# Patient Record
Sex: MALE | NOT HISPANIC OR LATINO | URBAN - METROPOLITAN AREA
[De-identification: names, ages, dates, MRNs, and addresses within clinical notes are randomized per-mention and may not be internally consistent; named-entity substitution may affect disease eponyms.]

---

## 2023-10-29 ENCOUNTER — INPATIENT (INPATIENT)
Facility: HOSPITAL | Age: 88
LOS: 8 days | Discharge: NURSING, REHAB OR LONG-TERM CARE | DRG: 521 | End: 2023-11-07
Attending: ORTHOPAEDIC SURGERY | Admitting: THORACIC SURGERY (CARDIOTHORACIC VASCULAR SURGERY)
Payer: MEDICARE

## 2023-10-29 VITALS
OXYGEN SATURATION: 100 % | SYSTOLIC BLOOD PRESSURE: 123 MMHG | DIASTOLIC BLOOD PRESSURE: 59 MMHG | RESPIRATION RATE: 20 BRPM | HEART RATE: 77 BPM

## 2023-10-29 DIAGNOSIS — I35.0 NONRHEUMATIC AORTIC (VALVE) STENOSIS: ICD-10-CM

## 2023-10-29 DIAGNOSIS — S72.001A FRACTURE OF UNSPECIFIED PART OF NECK OF RIGHT FEMUR, INITIAL ENCOUNTER FOR CLOSED FRACTURE: ICD-10-CM

## 2023-10-29 DIAGNOSIS — S12.110A ANTERIOR DISPLACED TYPE II DENS FRACTURE, INITIAL ENCOUNTER FOR CLOSED FRACTURE: ICD-10-CM

## 2023-10-29 DIAGNOSIS — I48.20 CHRONIC ATRIAL FIBRILLATION, UNSPECIFIED: ICD-10-CM

## 2023-10-29 LAB
ALBUMIN SERPL ELPH-MCNC: 3.4 G/DL — SIGNIFICANT CHANGE UP (ref 3.3–5)
ALBUMIN SERPL ELPH-MCNC: 3.4 G/DL — SIGNIFICANT CHANGE UP (ref 3.3–5)
ALP SERPL-CCNC: 115 U/L — SIGNIFICANT CHANGE UP (ref 40–120)
ALP SERPL-CCNC: 115 U/L — SIGNIFICANT CHANGE UP (ref 40–120)
ALT FLD-CCNC: 24 U/L — SIGNIFICANT CHANGE UP (ref 10–45)
ALT FLD-CCNC: 24 U/L — SIGNIFICANT CHANGE UP (ref 10–45)
ANION GAP SERPL CALC-SCNC: 13 MMOL/L — SIGNIFICANT CHANGE UP (ref 5–17)
APPEARANCE UR: ABNORMAL
APPEARANCE UR: ABNORMAL
APTT BLD: 31.2 SEC — SIGNIFICANT CHANGE UP (ref 24.5–35.6)
APTT BLD: 31.2 SEC — SIGNIFICANT CHANGE UP (ref 24.5–35.6)
AST SERPL-CCNC: 30 U/L — SIGNIFICANT CHANGE UP (ref 10–40)
AST SERPL-CCNC: 30 U/L — SIGNIFICANT CHANGE UP (ref 10–40)
BACTERIA # UR AUTO: NEGATIVE — SIGNIFICANT CHANGE UP
BACTERIA # UR AUTO: NEGATIVE — SIGNIFICANT CHANGE UP
BASOPHILS # BLD AUTO: 0.06 K/UL — SIGNIFICANT CHANGE UP (ref 0–0.2)
BASOPHILS # BLD AUTO: 0.06 K/UL — SIGNIFICANT CHANGE UP (ref 0–0.2)
BASOPHILS NFR BLD AUTO: 0.6 % — SIGNIFICANT CHANGE UP (ref 0–2)
BASOPHILS NFR BLD AUTO: 0.6 % — SIGNIFICANT CHANGE UP (ref 0–2)
BILIRUB SERPL-MCNC: 0.3 MG/DL — SIGNIFICANT CHANGE UP (ref 0.2–1.2)
BILIRUB SERPL-MCNC: 0.3 MG/DL — SIGNIFICANT CHANGE UP (ref 0.2–1.2)
BILIRUB UR-MCNC: NEGATIVE — SIGNIFICANT CHANGE UP
BILIRUB UR-MCNC: NEGATIVE — SIGNIFICANT CHANGE UP
BLD GP AB SCN SERPL QL: NEGATIVE — SIGNIFICANT CHANGE UP
BLD GP AB SCN SERPL QL: NEGATIVE — SIGNIFICANT CHANGE UP
BUN SERPL-MCNC: 42 MG/DL — HIGH (ref 7–23)
BUN SERPL-MCNC: 42 MG/DL — HIGH (ref 7–23)
BUN SERPL-MCNC: 43 MG/DL — HIGH (ref 7–23)
BUN SERPL-MCNC: 43 MG/DL — HIGH (ref 7–23)
CALCIUM SERPL-MCNC: 9.1 MG/DL — SIGNIFICANT CHANGE UP (ref 8.4–10.5)
CHLORIDE SERPL-SCNC: 107 MMOL/L — SIGNIFICANT CHANGE UP (ref 96–108)
CHLORIDE SERPL-SCNC: 107 MMOL/L — SIGNIFICANT CHANGE UP (ref 96–108)
CHLORIDE SERPL-SCNC: 108 MMOL/L — SIGNIFICANT CHANGE UP (ref 96–108)
CHLORIDE SERPL-SCNC: 108 MMOL/L — SIGNIFICANT CHANGE UP (ref 96–108)
CO2 SERPL-SCNC: 20 MMOL/L — LOW (ref 22–31)
CO2 SERPL-SCNC: 20 MMOL/L — LOW (ref 22–31)
CO2 SERPL-SCNC: 21 MMOL/L — LOW (ref 22–31)
CO2 SERPL-SCNC: 21 MMOL/L — LOW (ref 22–31)
COLOR SPEC: YELLOW — SIGNIFICANT CHANGE UP
COLOR SPEC: YELLOW — SIGNIFICANT CHANGE UP
CREAT SERPL-MCNC: 1.5 MG/DL — HIGH (ref 0.5–1.3)
CREAT SERPL-MCNC: 1.5 MG/DL — HIGH (ref 0.5–1.3)
CREAT SERPL-MCNC: 1.51 MG/DL — HIGH (ref 0.5–1.3)
CREAT SERPL-MCNC: 1.51 MG/DL — HIGH (ref 0.5–1.3)
DIFF PNL FLD: ABNORMAL
DIFF PNL FLD: ABNORMAL
EGFR: 40 ML/MIN/1.73M2 — LOW
EGFR: 40 ML/MIN/1.73M2 — LOW
EGFR: 41 ML/MIN/1.73M2 — LOW
EGFR: 41 ML/MIN/1.73M2 — LOW
EOSINOPHIL # BLD AUTO: 0.2 K/UL — SIGNIFICANT CHANGE UP (ref 0–0.5)
EOSINOPHIL # BLD AUTO: 0.2 K/UL — SIGNIFICANT CHANGE UP (ref 0–0.5)
EOSINOPHIL NFR BLD AUTO: 2.1 % — SIGNIFICANT CHANGE UP (ref 0–6)
EOSINOPHIL NFR BLD AUTO: 2.1 % — SIGNIFICANT CHANGE UP (ref 0–6)
EPI CELLS # UR: 3 /HPF — SIGNIFICANT CHANGE UP
EPI CELLS # UR: 3 /HPF — SIGNIFICANT CHANGE UP
GLUCOSE BLDC GLUCOMTR-MCNC: 113 MG/DL — HIGH (ref 70–99)
GLUCOSE BLDC GLUCOMTR-MCNC: 113 MG/DL — HIGH (ref 70–99)
GLUCOSE BLDC GLUCOMTR-MCNC: 35 MG/DL — CRITICAL LOW (ref 70–99)
GLUCOSE BLDC GLUCOMTR-MCNC: 35 MG/DL — CRITICAL LOW (ref 70–99)
GLUCOSE BLDC GLUCOMTR-MCNC: 45 MG/DL — CRITICAL LOW (ref 70–99)
GLUCOSE BLDC GLUCOMTR-MCNC: 45 MG/DL — CRITICAL LOW (ref 70–99)
GLUCOSE BLDC GLUCOMTR-MCNC: 51 MG/DL — CRITICAL LOW (ref 70–99)
GLUCOSE BLDC GLUCOMTR-MCNC: 51 MG/DL — CRITICAL LOW (ref 70–99)
GLUCOSE BLDC GLUCOMTR-MCNC: 74 MG/DL — SIGNIFICANT CHANGE UP (ref 70–99)
GLUCOSE BLDC GLUCOMTR-MCNC: 74 MG/DL — SIGNIFICANT CHANGE UP (ref 70–99)
GLUCOSE BLDC GLUCOMTR-MCNC: 94 MG/DL — SIGNIFICANT CHANGE UP (ref 70–99)
GLUCOSE BLDC GLUCOMTR-MCNC: 94 MG/DL — SIGNIFICANT CHANGE UP (ref 70–99)
GLUCOSE BLDC GLUCOMTR-MCNC: 95 MG/DL — SIGNIFICANT CHANGE UP (ref 70–99)
GLUCOSE BLDC GLUCOMTR-MCNC: 95 MG/DL — SIGNIFICANT CHANGE UP (ref 70–99)
GLUCOSE SERPL-MCNC: 110 MG/DL — HIGH (ref 70–99)
GLUCOSE SERPL-MCNC: 110 MG/DL — HIGH (ref 70–99)
GLUCOSE SERPL-MCNC: 82 MG/DL — SIGNIFICANT CHANGE UP (ref 70–99)
GLUCOSE SERPL-MCNC: 82 MG/DL — SIGNIFICANT CHANGE UP (ref 70–99)
GLUCOSE UR QL: NEGATIVE — SIGNIFICANT CHANGE UP
GLUCOSE UR QL: NEGATIVE — SIGNIFICANT CHANGE UP
HCT VFR BLD CALC: 32.1 % — LOW (ref 39–50)
HCT VFR BLD CALC: 32.1 % — LOW (ref 39–50)
HGB BLD-MCNC: 10.3 G/DL — LOW (ref 13–17)
HGB BLD-MCNC: 10.3 G/DL — LOW (ref 13–17)
HYALINE CASTS # UR AUTO: 6 /LPF — HIGH (ref 0–2)
HYALINE CASTS # UR AUTO: 6 /LPF — HIGH (ref 0–2)
IMM GRANULOCYTES NFR BLD AUTO: 0.5 % — SIGNIFICANT CHANGE UP (ref 0–0.9)
IMM GRANULOCYTES NFR BLD AUTO: 0.5 % — SIGNIFICANT CHANGE UP (ref 0–0.9)
INR BLD: 1.08 RATIO — SIGNIFICANT CHANGE UP (ref 0.85–1.18)
INR BLD: 1.08 RATIO — SIGNIFICANT CHANGE UP (ref 0.85–1.18)
KETONES UR-MCNC: ABNORMAL
KETONES UR-MCNC: ABNORMAL
LEUKOCYTE ESTERASE UR-ACNC: ABNORMAL
LEUKOCYTE ESTERASE UR-ACNC: ABNORMAL
LYMPHOCYTES # BLD AUTO: 0.54 K/UL — LOW (ref 1–3.3)
LYMPHOCYTES # BLD AUTO: 0.54 K/UL — LOW (ref 1–3.3)
LYMPHOCYTES # BLD AUTO: 5.8 % — LOW (ref 13–44)
LYMPHOCYTES # BLD AUTO: 5.8 % — LOW (ref 13–44)
MAGNESIUM SERPL-MCNC: 2.5 MG/DL — SIGNIFICANT CHANGE UP (ref 1.6–2.6)
MAGNESIUM SERPL-MCNC: 2.5 MG/DL — SIGNIFICANT CHANGE UP (ref 1.6–2.6)
MCHC RBC-ENTMCNC: 29.1 PG — SIGNIFICANT CHANGE UP (ref 27–34)
MCHC RBC-ENTMCNC: 29.1 PG — SIGNIFICANT CHANGE UP (ref 27–34)
MCHC RBC-ENTMCNC: 32.1 GM/DL — SIGNIFICANT CHANGE UP (ref 32–36)
MCHC RBC-ENTMCNC: 32.1 GM/DL — SIGNIFICANT CHANGE UP (ref 32–36)
MCV RBC AUTO: 90.7 FL — SIGNIFICANT CHANGE UP (ref 80–100)
MCV RBC AUTO: 90.7 FL — SIGNIFICANT CHANGE UP (ref 80–100)
MONOCYTES # BLD AUTO: 0.56 K/UL — SIGNIFICANT CHANGE UP (ref 0–0.9)
MONOCYTES # BLD AUTO: 0.56 K/UL — SIGNIFICANT CHANGE UP (ref 0–0.9)
MONOCYTES NFR BLD AUTO: 6 % — SIGNIFICANT CHANGE UP (ref 2–14)
MONOCYTES NFR BLD AUTO: 6 % — SIGNIFICANT CHANGE UP (ref 2–14)
NEUTROPHILS # BLD AUTO: 7.91 K/UL — HIGH (ref 1.8–7.4)
NEUTROPHILS # BLD AUTO: 7.91 K/UL — HIGH (ref 1.8–7.4)
NEUTROPHILS NFR BLD AUTO: 85 % — HIGH (ref 43–77)
NEUTROPHILS NFR BLD AUTO: 85 % — HIGH (ref 43–77)
NITRITE UR-MCNC: NEGATIVE — SIGNIFICANT CHANGE UP
NITRITE UR-MCNC: NEGATIVE — SIGNIFICANT CHANGE UP
NRBC # BLD: 0 /100 WBCS — SIGNIFICANT CHANGE UP (ref 0–0)
NRBC # BLD: 0 /100 WBCS — SIGNIFICANT CHANGE UP (ref 0–0)
NT-PROBNP SERPL-SCNC: 5690 PG/ML — HIGH (ref 0–300)
NT-PROBNP SERPL-SCNC: 5690 PG/ML — HIGH (ref 0–300)
PH UR: 5.5 — SIGNIFICANT CHANGE UP (ref 5–8)
PH UR: 5.5 — SIGNIFICANT CHANGE UP (ref 5–8)
PHOSPHATE SERPL-MCNC: 4.3 MG/DL — SIGNIFICANT CHANGE UP (ref 2.5–4.5)
PHOSPHATE SERPL-MCNC: 4.3 MG/DL — SIGNIFICANT CHANGE UP (ref 2.5–4.5)
PLATELET # BLD AUTO: 213 K/UL — SIGNIFICANT CHANGE UP (ref 150–400)
PLATELET # BLD AUTO: 213 K/UL — SIGNIFICANT CHANGE UP (ref 150–400)
POTASSIUM SERPL-MCNC: 4.8 MMOL/L — SIGNIFICANT CHANGE UP (ref 3.5–5.3)
POTASSIUM SERPL-MCNC: 4.8 MMOL/L — SIGNIFICANT CHANGE UP (ref 3.5–5.3)
POTASSIUM SERPL-MCNC: 5.6 MMOL/L — HIGH (ref 3.5–5.3)
POTASSIUM SERPL-MCNC: 5.6 MMOL/L — HIGH (ref 3.5–5.3)
POTASSIUM SERPL-SCNC: 4.8 MMOL/L — SIGNIFICANT CHANGE UP (ref 3.5–5.3)
POTASSIUM SERPL-SCNC: 4.8 MMOL/L — SIGNIFICANT CHANGE UP (ref 3.5–5.3)
POTASSIUM SERPL-SCNC: 5.6 MMOL/L — HIGH (ref 3.5–5.3)
POTASSIUM SERPL-SCNC: 5.6 MMOL/L — HIGH (ref 3.5–5.3)
PROT SERPL-MCNC: 6.6 G/DL — SIGNIFICANT CHANGE UP (ref 6–8.3)
PROT SERPL-MCNC: 6.6 G/DL — SIGNIFICANT CHANGE UP (ref 6–8.3)
PROT UR-MCNC: ABNORMAL
PROT UR-MCNC: ABNORMAL
PROTHROM AB SERPL-ACNC: 11.9 SEC — SIGNIFICANT CHANGE UP (ref 9.5–13)
PROTHROM AB SERPL-ACNC: 11.9 SEC — SIGNIFICANT CHANGE UP (ref 9.5–13)
RBC # BLD: 3.54 M/UL — LOW (ref 4.2–5.8)
RBC # BLD: 3.54 M/UL — LOW (ref 4.2–5.8)
RBC # FLD: 17.9 % — HIGH (ref 10.3–14.5)
RBC # FLD: 17.9 % — HIGH (ref 10.3–14.5)
RBC CASTS # UR COMP ASSIST: 629 /HPF — HIGH (ref 0–4)
RBC CASTS # UR COMP ASSIST: 629 /HPF — HIGH (ref 0–4)
RH IG SCN BLD-IMP: POSITIVE — SIGNIFICANT CHANGE UP
RH IG SCN BLD-IMP: POSITIVE — SIGNIFICANT CHANGE UP
SODIUM SERPL-SCNC: 141 MMOL/L — SIGNIFICANT CHANGE UP (ref 135–145)
SP GR SPEC: 1.02 — SIGNIFICANT CHANGE UP (ref 1.01–1.02)
SP GR SPEC: 1.02 — SIGNIFICANT CHANGE UP (ref 1.01–1.02)
T4 FREE SERPL-MCNC: 1 NG/DL — SIGNIFICANT CHANGE UP (ref 0.9–1.8)
T4 FREE SERPL-MCNC: 1 NG/DL — SIGNIFICANT CHANGE UP (ref 0.9–1.8)
TSH SERPL-MCNC: 4.76 UIU/ML — HIGH (ref 0.27–4.2)
TSH SERPL-MCNC: 4.76 UIU/ML — HIGH (ref 0.27–4.2)
UROBILINOGEN FLD QL: NEGATIVE — SIGNIFICANT CHANGE UP
UROBILINOGEN FLD QL: NEGATIVE — SIGNIFICANT CHANGE UP
WBC # BLD: 9.32 K/UL — SIGNIFICANT CHANGE UP (ref 3.8–10.5)
WBC # BLD: 9.32 K/UL — SIGNIFICANT CHANGE UP (ref 3.8–10.5)
WBC # FLD AUTO: 9.32 K/UL — SIGNIFICANT CHANGE UP (ref 3.8–10.5)
WBC # FLD AUTO: 9.32 K/UL — SIGNIFICANT CHANGE UP (ref 3.8–10.5)
WBC UR QL: 44 /HPF — HIGH (ref 0–5)
WBC UR QL: 44 /HPF — HIGH (ref 0–5)

## 2023-10-29 PROCEDURE — 73552 X-RAY EXAM OF FEMUR 2/>: CPT | Mod: 26,RT

## 2023-10-29 PROCEDURE — 93010 ELECTROCARDIOGRAM REPORT: CPT

## 2023-10-29 PROCEDURE — 99222 1ST HOSP IP/OBS MODERATE 55: CPT

## 2023-10-29 PROCEDURE — 70450 CT HEAD/BRAIN W/O DYE: CPT | Mod: 26

## 2023-10-29 PROCEDURE — 74018 RADEX ABDOMEN 1 VIEW: CPT | Mod: 26

## 2023-10-29 PROCEDURE — 71045 X-RAY EXAM CHEST 1 VIEW: CPT | Mod: 26

## 2023-10-29 PROCEDURE — 73562 X-RAY EXAM OF KNEE 3: CPT | Mod: 26,RT

## 2023-10-29 PROCEDURE — 71250 CT THORAX DX C-: CPT | Mod: 26

## 2023-10-29 PROCEDURE — 72125 CT NECK SPINE W/O DYE: CPT | Mod: 26

## 2023-10-29 PROCEDURE — 74176 CT ABD & PELVIS W/O CONTRAST: CPT | Mod: 26

## 2023-10-29 PROCEDURE — 73502 X-RAY EXAM HIP UNI 2-3 VIEWS: CPT | Mod: 26,RT

## 2023-10-29 RX ORDER — DEXTROSE 50 % IN WATER 50 %
50 SYRINGE (ML) INTRAVENOUS ONCE
Refills: 0 | Status: COMPLETED | OUTPATIENT
Start: 2023-10-29 | End: 2023-10-29

## 2023-10-29 RX ORDER — HEPARIN SODIUM 5000 [USP'U]/ML
5000 INJECTION INTRAVENOUS; SUBCUTANEOUS EVERY 8 HOURS
Refills: 0 | Status: COMPLETED | OUTPATIENT
Start: 2023-10-29 | End: 2023-10-30

## 2023-10-29 RX ORDER — SODIUM CHLORIDE 9 MG/ML
1000 INJECTION, SOLUTION INTRAVENOUS
Refills: 0 | Status: DISCONTINUED | OUTPATIENT
Start: 2023-10-29 | End: 2023-10-30

## 2023-10-29 RX ORDER — INFLUENZA VIRUS VACCINE 15; 15; 15; 15 UG/.5ML; UG/.5ML; UG/.5ML; UG/.5ML
0.7 SUSPENSION INTRAMUSCULAR ONCE
Refills: 0 | Status: DISCONTINUED | OUTPATIENT
Start: 2023-10-29 | End: 2023-11-07

## 2023-10-29 RX ORDER — ATORVASTATIN CALCIUM 80 MG/1
40 TABLET, FILM COATED ORAL AT BEDTIME
Refills: 0 | Status: DISCONTINUED | OUTPATIENT
Start: 2023-10-29 | End: 2023-10-31

## 2023-10-29 RX ORDER — PANTOPRAZOLE SODIUM 20 MG/1
40 TABLET, DELAYED RELEASE ORAL
Refills: 0 | Status: DISCONTINUED | OUTPATIENT
Start: 2023-10-29 | End: 2023-10-31

## 2023-10-29 RX ORDER — SODIUM CHLORIDE 9 MG/ML
3 INJECTION INTRAMUSCULAR; INTRAVENOUS; SUBCUTANEOUS EVERY 8 HOURS
Refills: 0 | Status: DISCONTINUED | OUTPATIENT
Start: 2023-10-29 | End: 2023-10-31

## 2023-10-29 RX ADMIN — SODIUM CHLORIDE 30 MILLILITER(S): 9 INJECTION, SOLUTION INTRAVENOUS at 23:13

## 2023-10-29 RX ADMIN — Medication 50 MILLILITER(S): at 22:20

## 2023-10-29 NOTE — H&P ADULT - NSICDXPASTMEDICALHX_GEN_ALL_CORE_FT
PAST MEDICAL HISTORY:  CAD (coronary artery disease)     COPD without exacerbation     Hypertension     Unspecified atrial fibrillation

## 2023-10-29 NOTE — H&P ADULT - NSHPPHYSICALEXAM_GEN_ALL_CORE
General: Pleasant appearing in NAD.  Neuro: AAO x3 with short term memory loss  Skin: Dryness noted throughout.  Head: NCAT.  Eyes: Pupils equal and reactive.  Neck: C-collar in place. No JVD.  CV: AF 70s, S1S2, irregular rhythm.  Pulm: CTABL, no wheezing, rhonchi  : Sosa in place making good urine. No hematuria.  GI: Abd soft, NT, ND, positive BS throughout  Vasc: Strong palpable distal pulses b/l.  MSK: Rt hip/upper leg tender to palpation; RLE external rotation.

## 2023-10-29 NOTE — H&P ADULT - ASSESSMENT
89M PMHx AF (no AC), HTN, COPD, & CAD, dry skin transferred presented to outside hospital s/p mechanical fall at home. XR Rt femur showed Rt femoral neck fx & CTH showed type II odontoid process fx. Remains in C-collar. Pt was also found to have aortic stenosis & was transferred to [facility] under Dr. Almeida for further workup & management.

## 2023-10-29 NOTE — H&P ADULT - HISTORY OF PRESENT ILLNESS
89M PMHx AF (no AC), HTN, COPD, & CAD, dry skin transferred presented to outside hospital s/p mechanical fall at home. XR Rt femur showed Rt femoral neck fx & CTH showed type II odontoid process fx. Remains in C-collar. Pt was also found to have aortic stenosis & was transferred to [facility] under Dr. Almeida for further workup & management.

## 2023-10-29 NOTE — CONSULT NOTE ADULT - SUBJECTIVE AND OBJECTIVE BOX
TRAUMA SERVICE (Acute Care Surgery / ACS - #9089) - CONSULT NOTE  --------------------------------------------------------------------------------------------    MECHANISM OF INJURY:   [x] Blunt:  [x] Fall	    GCS: 	E: 4	V: 5	M: 6    HPI:  89 yo M w/ PMHx afib (no AC), HTN, COPD, & CAD, dry skin transferred presented to outside hospital s/p mechanical fall at home. Work up at OSH shows XR Rt femur showed Rt femoral neck fx & CTH showed type II odontoid process fx. Remains in C-collar. Pt was also found to have aortic stenosis & was transferred to [facility] ICU under Dr. Almeida for further workup & management.  Trauma surgery consulted for evaluation     On exam, patient hemodynamically stable, however AOx1-2 (AOx3 at baseline).     Primary Survey:   A - airway intact  B - bilateral breath sounds and good chest rise  C - initial BP: 123/59 (10-29-23 @ 19:00) , HR: 77 (10-29-23 @ 19:00) , palpable pulses in all extremities  D - GCS 15 on arrival  Exposure obtained      Secondary Survey:  General: NAD  HEENT: Normocephalic, atraumatic, EOMI, PEERLA.  Neck: Soft, midline trachea, C-collar in place  Chest: No chest wall tenderness.   Cardiac: S1, S2  Respiratory: Bilateral breath sounds, clear and equal bilaterally  Abdomen: Soft, non-distended, non-tender, no rebound, no guarding, no masses palpated  Pelvis: Stable, R hip TTP, no ecchymosis  Ext: motor and sensory grossly intact in all 4 extremities  Back: no TTP, no palpable runoff/stepoff/deformity    ROS: 10-system review is otherwise negative except HPI above.          PAST MEDICAL & SURGICAL HISTORY:  Unspecified atrial fibrillation      COPD without exacerbation      Hypertension      CAD (coronary artery disease)      ALLERGIES: Allergy Status Unknown      CURRENT MEDICATIONS  MEDICATIONS (STANDING): atorvastatin 40 milliGRAM(s) Oral at bedtime  heparin   Injectable 5000 Unit(s) SubCutaneous every 8 hours  pantoprazole    Tablet 40 milliGRAM(s) Oral before breakfast  sodium chloride 0.9% lock flush 3 milliLiter(s) IV Push every 8 hours    MEDICATIONS (PRN):  --------------------------------------------------------------------------------------------    Vitals:   HR: 77 (10-29-23 @ 19:00) (77 - 77)  BP: 123/59 (10-29-23 @ 19:00) (123/59 - 123/59)  RR: 20 (10-29-23 @ 19:00) (20 - 20)  SpO2: 100% (10-29-23 @ 19:00) (100% - 100%)  CAPILLARY BLOOD GLUCOSE        CAPILLARY BLOOD GLUCOSE          10-29 @ 07:01  -  10-29 @ 20:07  --------------------------------------------------------  IN:  Total IN: 0 mL    OUT:    Indwelling Catheter - Urethral (mL): 175 mL  Total OUT: 175 mL    Total NET: -175 mL          --------------------------------------------------------------------------------------------    LABS  CBC (10-29 @ 19:24)                              10.3<L>                         9.32    )----------------(  213        85.0<H>% Neutrophils, 5.8<L>% Lymphocytes, ANC: 7.91<H>                              32.1<L>    BMP (10-29 @ 19:24)             141     |  107     |  42<H> 		Ca++ --      Ca 9.1                ---------------------------------( 82    		Mg 2.5                5.6<H>  |  21<L>   |  1.50<H>			Ph 4.3       LFTs (10-29 @ 19:24)      TPro 6.6 / Alb 3.4 / TBili 0.3 / DBili -- / AST 30 / ALT 24 / AlkPhos 115    Coags (10-29 @ 19:24)  aPTT 31.2 / INR 1.08 / PT 11.9      --------------------------------------------------------------------------------------------    MICROBIOLOGY  Urinalysis (10-29 @ 19:25):     Color: Yellow / Appearance: Slightly Turbid<!> / S.023 / pH: 5.5 / Gluc: Negative / Ketones: Small<!> / Bili: Negative / Urobili: Negative / Protein :30 mg/dL<!> / Nitrites: Negative / Leuk.Est: Large<!> / RBC: 629<H> / WBC: 44<H> / Sq Epi:  / Non Sq Epi:  / Bacteria Negative      TRAUMA SERVICE (Acute Care Surgery / [facility] - #9083) - CONSULT NOTE  --------------------------------------------------------------------------------------------    MECHANISM OF INJURY:   [x] Blunt:  [x] Fall	    GCS: 	E: 4	V: 5	M: 6    HPI:  89 yo M w/ PMHx afib (no AC), HTN, COPD, & CAD, dry skin transferred presented to outside hospital s/p mechanical fall at home. Work up at OSH shows XR Rt femur showed Rt femoral neck fx & CTH showed type II odontoid process fx. Remains in C-collar. Pt was also found to have aortic stenosis & was transferred to [facility] CT ICU under Dr. Almeida for further workup & management.  Trauma surgery consulted for evaluation     On exam, patient hemodynamically stable, however AOx1-2 (AOx3 at baseline).     Primary Survey:   A - airway intact  B - bilateral breath sounds and good chest rise  C - initial BP: 123/59 (10-29-23 @ 19:00) , HR: 77 (10-29-23 @ 19:00) , palpable pulses in all extremities  D - GCS 15 on arrival  Exposure obtained      Secondary Survey:  General: NAD  HEENT: Normocephalic, atraumatic, EOMI  Neck: Soft, midline trachea, C-collar in place  Chest: No chest wall tenderness.   Cardiac: S1, S2  Respiratory: Bilateral breath sounds, clear and equal bilaterally  Abdomen: Soft, non-distended, non-tender, no rebound, no guarding, no masses palpated  Pelvis: Stable, R hip TTP, no ecchymosis  Ext: motor and sensory grossly intact in all 4 extremities  Back: no TTP, no palpable runoff/stepoff/deformity    ROS: 10-system review is otherwise negative except HPI above.          PAST MEDICAL & SURGICAL HISTORY:  Unspecified atrial fibrillation      COPD without exacerbation      Hypertension      CAD (coronary artery disease)      ALLERGIES: Allergy Status Unknown      CURRENT MEDICATIONS  MEDICATIONS (STANDING): atorvastatin 40 milliGRAM(s) Oral at bedtime  heparin   Injectable 5000 Unit(s) SubCutaneous every 8 hours  pantoprazole    Tablet 40 milliGRAM(s) Oral before breakfast  sodium chloride 0.9% lock flush 3 milliLiter(s) IV Push every 8 hours    MEDICATIONS (PRN):  --------------------------------------------------------------------------------------------    Vitals:   HR: 77 (10-29-23 @ 19:00) (77 - 77)  BP: 123/59 (10-29-23 @ 19:00) (123/59 - 123/59)  RR: 20 (10-29-23 @ 19:00) (20 - 20)  SpO2: 100% (10-29-23 @ 19:00) (100% - 100%)  CAPILLARY BLOOD GLUCOSE        CAPILLARY BLOOD GLUCOSE          10-29 @ 07:01  -  10-29 @ 20:07  --------------------------------------------------------  IN:  Total IN: 0 mL    OUT:    Indwelling Catheter - Urethral (mL): 175 mL  Total OUT: 175 mL    Total NET: -175 mL          --------------------------------------------------------------------------------------------    LABS  CBC (10-29 @ 19:24)                              10.3<L>                         9.32    )----------------(  213        85.0<H>% Neutrophils, 5.8<L>% Lymphocytes, ANC: 7.91<H>                              32.1<L>    BMP (10-29 @ 19:24)             141     |  107     |  42<H> 		Ca++ --      Ca 9.1                ---------------------------------( 82    		Mg 2.5                5.6<H>  |  21<L>   |  1.50<H>			Ph 4.3       LFTs (10-29 @ 19:24)      TPro 6.6 / Alb 3.4 / TBili 0.3 / DBili -- / AST 30 / ALT 24 / AlkPhos 115    Coags (10-29 @ 19:24)  aPTT 31.2 / INR 1.08 / PT 11.9      --------------------------------------------------------------------------------------------    MICROBIOLOGY  Urinalysis (10-29 @ 19:25):     Color: Yellow / Appearance: Slightly Turbid<!> / S.023 / pH: 5.5 / Gluc: Negative / Ketones: Small<!> / Bili: Negative / Urobili: Negative / Protein :30 mg/dL<!> / Nitrites: Negative / Leuk.Est: Large<!> / RBC: 629<H> / WBC: 44<H> / Sq Epi:  / Non Sq Epi:  / Bacteria Negative      TRAUMA SERVICE (Acute Care Surgery / [facility] - #9039) - CONSULT NOTE  --------------------------------------------------------------------------------------------    MECHANISM OF INJURY:   [x] Blunt:  [x] Fall	    GCS: 	E: 4	V: 4	M: 6    HPI:  89 yo M w/ PMHx afib (no AC), HTN, COPD, & CAD, dry skin transferred presented to outside hospital s/p mechanical fall at home. Work up at OSH shows XR Rt femur showed Rt femoral neck fx & CTH showed type II odontoid process fx. Remains in C-collar. Pt was also found to have aortic stenosis & was transferred to [facility] CT ICU under Dr. Almeida for further workup & management.  Trauma surgery consulted for evaluation     On exam, patient hemodynamically stable, however AOx1-2 (AOx3 at baseline).     Primary Survey:   A - airway intact  B - bilateral breath sounds and good chest rise  C - initial BP: 123/59 (10-29-23 @ 19:00) , HR: 77 (10-29-23 @ 19:00)   D - GCS 14 on arrival  Exposure obtained      Secondary Survey:  General: NAD  HEENT: Normocephalic, atraumatic, EOMI  Neck: Soft, midline trachea, C-collar in place  Chest: No chest wall tenderness.   Cardiac: S1, S2  Respiratory: Bilateral breath sounds, clear and equal bilaterally  Abdomen: Soft, non-distended, non-tender, no rebound, no guarding, no masses palpated  Pelvis: Stable, R hip TTP, no ecchymosis  Ext: motor and sensory grossly intact in all 4 extremities  Back: no TTP, no palpable runoff/stepoff/deformity    ROS: 10-system review is otherwise negative except HPI above.          PAST MEDICAL & SURGICAL HISTORY:  Unspecified atrial fibrillation      COPD without exacerbation      Hypertension      CAD (coronary artery disease)      ALLERGIES: Allergy Status Unknown      CURRENT MEDICATIONS  MEDICATIONS (STANDING): atorvastatin 40 milliGRAM(s) Oral at bedtime  heparin   Injectable 5000 Unit(s) SubCutaneous every 8 hours  pantoprazole    Tablet 40 milliGRAM(s) Oral before breakfast  sodium chloride 0.9% lock flush 3 milliLiter(s) IV Push every 8 hours    MEDICATIONS (PRN):  --------------------------------------------------------------------------------------------    Vitals:   HR: 77 (10-29-23 @ 19:00) (77 - 77)  BP: 123/59 (10-29-23 @ 19:00) (123/59 - 123/59)  RR: 20 (10-29-23 @ 19:00) (20 - 20)  SpO2: 100% (10-29-23 @ 19:00) (100% - 100%)  CAPILLARY BLOOD GLUCOSE        CAPILLARY BLOOD GLUCOSE          10-29 @ 07:01  -  10-29 @ 20:07  --------------------------------------------------------  IN:  Total IN: 0 mL    OUT:    Indwelling Catheter - Urethral (mL): 175 mL  Total OUT: 175 mL    Total NET: -175 mL          --------------------------------------------------------------------------------------------    LABS  CBC (10-29 @ 19:24)                              10.3<L>                         9.32    )----------------(  213        85.0<H>% Neutrophils, 5.8<L>% Lymphocytes, ANC: 7.91<H>                              32.1<L>    BMP (10-29 @ 19:24)             141     |  107     |  42<H> 		Ca++ --      Ca 9.1                ---------------------------------( 82    		Mg 2.5                5.6<H>  |  21<L>   |  1.50<H>			Ph 4.3       LFTs (10-29 @ 19:24)      TPro 6.6 / Alb 3.4 / TBili 0.3 / DBili -- / AST 30 / ALT 24 / AlkPhos 115    Coags (10-29 @ 19:24)  aPTT 31.2 / INR 1.08 / PT 11.9      --------------------------------------------------------------------------------------------    MICROBIOLOGY  Urinalysis (10-29 @ 19:25):     Color: Yellow / Appearance: Slightly Turbid<!> / S.023 / pH: 5.5 / Gluc: Negative / Ketones: Small<!> / Bili: Negative / Urobili: Negative / Protein :30 mg/dL<!> / Nitrites: Negative / Leuk.Est: Large<!> / RBC: 629<H> / WBC: 44<H> / Sq Epi:  / Non Sq Epi:  / Bacteria Negative

## 2023-10-29 NOTE — H&P ADULT - NSHPREVIEWOFSYSTEMS_GEN_ALL_CORE
General: Denies fever, chills  Neuro: Denies headache, paresthesias, dizziness  Eyes: Denies blurry vision, diplopia  CV: Denies chest pain, palpitations  Resp: Denies SOB, cough  : Denies dysuria, hematuria  GI: Denies abdominal pain, N/V  Heme: Denies weakness, tiredness  MSK: Endorses RLE pain  Psych: Denies anxiety, depression, michael  Vasc: Denies leg claudication

## 2023-10-29 NOTE — PATIENT PROFILE ADULT - FALL HARM RISK - HARM RISK INTERVENTIONS
Assistance with ambulation/Assistance OOB with selected safe patient handling equipment/Communicate Risk of Fall with Harm to all staff/Discuss with provider need for PT consult/Monitor for mental status changes/Monitor gait and stability/Move patient closer to nurses' station/Provide patient with walking aids - walker, cane, crutches/Reinforce activity limits and safety measures with patient and family/Reorient to person, place and time as needed/Tailored Fall Risk Interventions/Toileting schedule using arm’s reach rule for commode and bathroom/Use of alarms - bed, chair and/or voice tab/Visual Cue: Yellow wristband and red socks/Bed in lowest position, wheels locked, appropriate side rails in place/Call bell, personal items and telephone in reach/Instruct patient to call for assistance before getting out of bed or chair/Non-slip footwear when patient is out of bed/[place] to call system/Physically safe environment - no spills, clutter or unnecessary equipment/Purposeful Proactive Rounding/Room/bathroom lighting operational, light cord in reach

## 2023-10-29 NOTE — CONSULT NOTE ADULT - ASSESSMENT
89 yo M w/ PMHx afib (no AC), HTN, COPD, & CAD, dry skin transferred presented to outside hospital s/p mechanical fall at home. Work up at OSH shows XR Rt femur showed Rt femoral neck fx & CTH showed type II odontoid process fx. Remains in C-collar. Pt was also found to have aortic stenosis & was transferred to [facility] CT ICU under Dr. Almeida for further workup & management.  Trauma surgery consulted for evaluation       Recommendations:   - No acute trauma surgical intervention   - Obtain work/up imaging complete at OSH      - If unable, will need repeat trauma scans. Hold on contrast given elevated Cr, unknown baseline      - Will f/u scans  - Will perform tertiary within 24 hours   - Ortho c/s for suspected R femoral neck fx  - Spine c/s for suspected type II odontoid process fx      Discussed w/ Dr. Nichols       ACS/Trauma  [id number]  89 yo M w/ PMHx afib (no AC), HTN, COPD, & CAD, dry skin transferred presented to outside hospital s/p mechanical fall at home. Work up at OSH shows XR Rt femur showed Rt femoral neck fx & CTH showed type II odontoid process fx. Remains in C-collar. Pt was also found to have aortic stenosis & was transferred to [facility] CT ICU under Dr. Almeida for further workup & management.  Trauma surgery consulted for evaluation       Recommendations:   - No acute trauma surgical intervention   - Obtain work/up imaging complete at OSH      - If unable, will need repeat trauma scans. Hold on contrast given elevated Cr, unknown baseline      - Recommend holding chemical DVT ppx, ASA until scans complete     - Will f/u scans  - Will perform tertiary within 24 hours   - Ortho c/s for suspected R femoral neck fx  - Spine c/s for suspected type II odontoid process fx      Discussed w/ Dr. Nichols       ACS/Trauma  [id number]  89 yo M w/ PMHx afib (no AC), HTN, COPD, & CAD, dry skin transferred presented to outside hospital s/p mechanical fall at home. Work up at OSH shows XR Rt femur showed Rt femoral neck fx & CTH showed type II odontoid process fx. Remains in C-collar. Pt was also found to have aortic stenosis & was transferred to [facility] CT ICU under Dr. Almeida for further workup & management.  Trauma surgery consulted for evaluation       Recommendations:   - No acute trauma surgical intervention   - Obtain work/up imaging complete at OSH      - If unable, will need repeat trauma scans. Hold on contrast given elevated Cr, unknown baseline      - Recommend holding chemical DVT ppx, ASA until scans complete     - Will f/u scans  - Will perform tertiary within 24 hours   - Ortho c/s for reported R femoral neck fx  - Spine c/s for reported type II odontoid process fx      Discussed w/ Dr. Nichols       ACS/Trauma  [id number]

## 2023-10-30 ENCOUNTER — TRANSCRIPTION ENCOUNTER (OUTPATIENT)
Age: 88
End: 2023-10-30

## 2023-10-30 DIAGNOSIS — I35.0 NONRHEUMATIC AORTIC (VALVE) STENOSIS: ICD-10-CM

## 2023-10-30 LAB
A1C WITH ESTIMATED AVERAGE GLUCOSE RESULT: 5.5 % — SIGNIFICANT CHANGE UP (ref 4–5.6)
A1C WITH ESTIMATED AVERAGE GLUCOSE RESULT: 5.5 % — SIGNIFICANT CHANGE UP (ref 4–5.6)
ESTIMATED AVERAGE GLUCOSE: 111 MG/DL — SIGNIFICANT CHANGE UP (ref 68–114)
ESTIMATED AVERAGE GLUCOSE: 111 MG/DL — SIGNIFICANT CHANGE UP (ref 68–114)
GLUCOSE BLDC GLUCOMTR-MCNC: 100 MG/DL — HIGH (ref 70–99)
GLUCOSE BLDC GLUCOMTR-MCNC: 100 MG/DL — HIGH (ref 70–99)
GLUCOSE BLDC GLUCOMTR-MCNC: 102 MG/DL — HIGH (ref 70–99)
GLUCOSE BLDC GLUCOMTR-MCNC: 102 MG/DL — HIGH (ref 70–99)
GLUCOSE BLDC GLUCOMTR-MCNC: 108 MG/DL — HIGH (ref 70–99)
GLUCOSE BLDC GLUCOMTR-MCNC: 108 MG/DL — HIGH (ref 70–99)
GLUCOSE BLDC GLUCOMTR-MCNC: 92 MG/DL — SIGNIFICANT CHANGE UP (ref 70–99)
GLUCOSE BLDC GLUCOMTR-MCNC: 92 MG/DL — SIGNIFICANT CHANGE UP (ref 70–99)
MRSA PCR RESULT.: SIGNIFICANT CHANGE UP
MRSA PCR RESULT.: SIGNIFICANT CHANGE UP
RH IG SCN BLD-IMP: POSITIVE — SIGNIFICANT CHANGE UP
RH IG SCN BLD-IMP: POSITIVE — SIGNIFICANT CHANGE UP
S AUREUS DNA NOSE QL NAA+PROBE: DETECTED
S AUREUS DNA NOSE QL NAA+PROBE: DETECTED

## 2023-10-30 PROCEDURE — 99291 CRITICAL CARE FIRST HOUR: CPT

## 2023-10-30 PROCEDURE — 93306 TTE W/DOPPLER COMPLETE: CPT | Mod: 26

## 2023-10-30 PROCEDURE — 99232 SBSQ HOSP IP/OBS MODERATE 35: CPT | Mod: FS

## 2023-10-30 PROCEDURE — 73501 X-RAY EXAM HIP UNI 1 VIEW: CPT | Mod: 26,RT

## 2023-10-30 PROCEDURE — 93010 ELECTROCARDIOGRAM REPORT: CPT

## 2023-10-30 PROCEDURE — 72192 CT PELVIS W/O DYE: CPT | Mod: 26

## 2023-10-30 PROCEDURE — 73551 X-RAY EXAM OF FEMUR 1: CPT | Mod: 26,RT

## 2023-10-30 PROCEDURE — 76377 3D RENDER W/INTRP POSTPROCES: CPT | Mod: 26

## 2023-10-30 RX ORDER — ACETAMINOPHEN 500 MG
1000 TABLET ORAL ONCE
Refills: 0 | Status: COMPLETED | OUTPATIENT
Start: 2023-10-30 | End: 2023-10-30

## 2023-10-30 RX ORDER — SODIUM CHLORIDE 9 MG/ML
1000 INJECTION, SOLUTION INTRAVENOUS
Refills: 0 | Status: DISCONTINUED | OUTPATIENT
Start: 2023-10-30 | End: 2023-10-31

## 2023-10-30 RX ORDER — CIPROFLOXACIN LACTATE 400MG/40ML
250 VIAL (ML) INTRAVENOUS DAILY
Refills: 0 | Status: DISCONTINUED | OUTPATIENT
Start: 2023-10-30 | End: 2023-10-30

## 2023-10-30 RX ORDER — MUPIROCIN 20 MG/G
1 OINTMENT TOPICAL
Refills: 0 | Status: DISCONTINUED | OUTPATIENT
Start: 2023-10-30 | End: 2023-10-31

## 2023-10-30 RX ORDER — CIPROFLOXACIN LACTATE 400MG/40ML
250 VIAL (ML) INTRAVENOUS DAILY
Refills: 0 | Status: DISCONTINUED | OUTPATIENT
Start: 2023-10-30 | End: 2023-10-31

## 2023-10-30 RX ORDER — LEVOTHYROXINE SODIUM 125 MCG
25 TABLET ORAL DAILY
Refills: 0 | Status: DISCONTINUED | OUTPATIENT
Start: 2023-10-30 | End: 2023-10-31

## 2023-10-30 RX ADMIN — HEPARIN SODIUM 5000 UNIT(S): 5000 INJECTION INTRAVENOUS; SUBCUTANEOUS at 05:40

## 2023-10-30 RX ADMIN — SODIUM CHLORIDE 40 MILLILITER(S): 9 INJECTION, SOLUTION INTRAVENOUS at 13:54

## 2023-10-30 RX ADMIN — SODIUM CHLORIDE 3 MILLILITER(S): 9 INJECTION INTRAMUSCULAR; INTRAVENOUS; SUBCUTANEOUS at 21:36

## 2023-10-30 RX ADMIN — Medication 1000 MILLIGRAM(S): at 02:25

## 2023-10-30 RX ADMIN — SODIUM CHLORIDE 3 MILLILITER(S): 9 INJECTION INTRAMUSCULAR; INTRAVENOUS; SUBCUTANEOUS at 13:30

## 2023-10-30 RX ADMIN — Medication 250 MILLIGRAM(S): at 13:55

## 2023-10-30 RX ADMIN — ATORVASTATIN CALCIUM 40 MILLIGRAM(S): 80 TABLET, FILM COATED ORAL at 21:25

## 2023-10-30 RX ADMIN — HEPARIN SODIUM 5000 UNIT(S): 5000 INJECTION INTRAVENOUS; SUBCUTANEOUS at 13:55

## 2023-10-30 RX ADMIN — Medication 400 MILLIGRAM(S): at 02:05

## 2023-10-30 RX ADMIN — SODIUM CHLORIDE 3 MILLILITER(S): 9 INJECTION INTRAMUSCULAR; INTRAVENOUS; SUBCUTANEOUS at 05:22

## 2023-10-30 RX ADMIN — MUPIROCIN 1 APPLICATION(S): 20 OINTMENT TOPICAL at 17:33

## 2023-10-30 RX ADMIN — HEPARIN SODIUM 5000 UNIT(S): 5000 INJECTION INTRAVENOUS; SUBCUTANEOUS at 21:25

## 2023-10-30 NOTE — CONSULT NOTE ADULT - SUBJECTIVE AND OBJECTIVE BOX
HPI  89yMale transfer from [facility] for w/u of aortic stenosis. Patient had fall on Friday night 10/27 sustained R femoral neck fx. c/o R hip pain s/p mechanical fall. Unable to bear weight in the RLE since the fall. Denies headstrike or LOC. Denies numbness/tingling in the RLE. Denies any other trauma/injuries at this time. At baseline, home ambulator w/ walker.     ROS  Negative unless otherwise specified in HPI.    PAST MEDICAL & SURGICAL Hx  PAST MEDICAL & SURGICAL HISTORY:  Unspecified atrial fibrillation      COPD without exacerbation      Hypertension      CAD (coronary artery disease)          MEDICATIONS  Home Medications:      ALLERGIES  Allergy Status Unknown      FAMILY Hx  FAMILY HISTORY:      SOCIAL Hx  Social History:      VITALS  Vital Signs Last 24 Hrs  T(C): 36.5 (30 Oct 2023 00:00), Max: 36.5 (30 Oct 2023 00:00)  T(F): 97.7 (30 Oct 2023 00:00), Max: 97.7 (30 Oct 2023 00:00)  HR: 81 (30 Oct 2023 02:00) (77 - 87)  BP: 130/60 (30 Oct 2023 02:00) (115/54 - 160/74)  BP(mean): 81 (30 Oct 2023 02:00) (78 - 106)  RR: 23 (30 Oct 2023 02:00) (10 - 29)  SpO2: 94% (30 Oct 2023 02:00) (83% - 100%)    Parameters below as of 30 Oct 2023 00:00  Patient On (Oxygen Delivery Method): nasal cannula  O2 Flow (L/min): 4      PHYSICAL EXAM  Gen: Lying in bed, NAD  Resp: No increased WOB  RLE:  Skin intact, shortened and externally rotated, +edema and +ecchymosis over R hip  +TTP over R hip, no TTP along remainder of extremity; compartments soft  Limited ROM at hip 2/2 pain  +Log roll test  +Pain with axial loading  Motor: TA/EHL/GS/FHL intact  Sensory: DP/SP/Tib/Staci/Saph SILT  +DP pulse, WWP    Secondary survey:  No TTP along spine or other extremities, pelvis grossly stable, SILT and compartments soft throughout    LABS                        10.3   9.32  )-----------( 213      ( 29 Oct 2023 19:24 )             32.1     10-29    141  |  108  |  43<H>  ----------------------------<  110<H>  4.8   |  20<L>  |  1.51<H>    Ca    9.1      29 Oct 2023 22:54  Phos  4.3     10-29  Mg     2.5     10-29    TPro  6.6  /  Alb  3.4  /  TBili  0.3  /  DBili  x   /  AST  30  /  ALT  24  /  AlkPhos  115  10-29    PT/INR - ( 29 Oct 2023 19:24 )   PT: 11.9 sec;   INR: 1.08 ratio         PTT - ( 29 Oct 2023 19:24 )  PTT:31.2 sec    IMAGING  XRs: R femoral neck fx (personal read)    ASSESSMENT & PLAN  89yMale w/ R femoral neck fx.    -Patient indicated for OR for R hip hemiarthroplasty  -patient being worked up by structural heart team for AS  f/u TTE   Pain control  -NWB RLE, bedrest  -OR pending plan from Structural heart team  -f/u preop: CBC, BMP, coags, T&S x2, CXR, EKG  -dvt ppx per primary  -please document medical clearance ASAP for OR  -pain control  -ice/cold compress HPI  89yMale transfer from [facility] for w/u of aortic stenosis. Patient had fall on Friday night 10/27 sustained R femoral neck fx. c/o R hip pain s/p mechanical fall. Unable to bear weight in the RLE since the fall. Denies headstrike or LOC. Denies numbness/tingling in the RLE. Denies any other trauma/injuries at this time. At baseline, home ambulator w/ walker.     ROS  Negative unless otherwise specified in HPI.    PAST MEDICAL & SURGICAL Hx  PAST MEDICAL & SURGICAL HISTORY:  Unspecified atrial fibrillation      COPD without exacerbation      Hypertension      CAD (coronary artery disease)          MEDICATIONS  Home Medications:      ALLERGIES  Allergy Status Unknown      FAMILY Hx  FAMILY HISTORY:      SOCIAL Hx  Social History:      VITALS  Vital Signs Last 24 Hrs  T(C): 36.5 (30 Oct 2023 00:00), Max: 36.5 (30 Oct 2023 00:00)  T(F): 97.7 (30 Oct 2023 00:00), Max: 97.7 (30 Oct 2023 00:00)  HR: 81 (30 Oct 2023 02:00) (77 - 87)  BP: 130/60 (30 Oct 2023 02:00) (115/54 - 160/74)  BP(mean): 81 (30 Oct 2023 02:00) (78 - 106)  RR: 23 (30 Oct 2023 02:00) (10 - 29)  SpO2: 94% (30 Oct 2023 02:00) (83% - 100%)    Parameters below as of 30 Oct 2023 00:00  Patient On (Oxygen Delivery Method): nasal cannula  O2 Flow (L/min): 4      PHYSICAL EXAM  Gen: Lying in bed, NAD  Resp: No increased WOB  RLE:  Skin intact, shortened and externally rotated, +edema and +ecchymosis over R hip  +TTP over R hip, no TTP along remainder of extremity; compartments soft  Limited ROM at hip 2/2 pain  +Log roll test  +Pain with axial loading  Motor: TA/EHL/GS/FHL intact  Sensory: DP/SP/Tib/Staci/Saph SILT  +DP pulse, WWP    Secondary survey:  No TTP along spine or other extremities, pelvis grossly stable, SILT and compartments soft throughout    LABS                        10.3   9.32  )-----------( 213      ( 29 Oct 2023 19:24 )             32.1     10-29    141  |  108  |  43<H>  ----------------------------<  110<H>  4.8   |  20<L>  |  1.51<H>    Ca    9.1      29 Oct 2023 22:54  Phos  4.3     10-29  Mg     2.5     10-29    TPro  6.6  /  Alb  3.4  /  TBili  0.3  /  DBili  x   /  AST  30  /  ALT  24  /  AlkPhos  115  10-29    PT/INR - ( 29 Oct 2023 19:24 )   PT: 11.9 sec;   INR: 1.08 ratio         PTT - ( 29 Oct 2023 19:24 )  PTT:31.2 sec    IMAGING  XRs: R femoral neck fx (personal read)    ASSESSMENT & PLAN  89yMale w/ R femoral neck fx.    -Patient indicated for OR for R hip hemiarthroplasty  -patient being worked up by structural heart team for AS  PLEASE CONSULT NEUROSURGERY FOR RECS REGARDING ODONTOID FRACTURE GIVEN NEED FOR SURGERY/INTUBATION  f/u TTE   Pain control  -NWB RLE, bedrest  -OR pending plan from Structural heart team  -f/u preop: CBC, BMP, coags, T&S x2, CXR, EKG  -dvt ppx per primary  -please document medical clearance ASAP for OR  -pain control  -ice/cold compress

## 2023-10-30 NOTE — CONSULT NOTE ADULT - NS ATTEND AMEND GEN_ALL_CORE FT
The Structural Heart team was asked to evaluate in the setting of low gradient aortic stenosis in a patient with a recent fall that is being considered for orthopedic surgical repair. The patient was aware of the AS for several months and sounds like he was offered intervention at a [facility] 2 months ago, which he declined. He has multiple fractures and is immobilized in a C-collar. The AV gradients are very low and the LV function is normal. This is not a patient who appears to be a reasonable candidate for a cardiac CT, cardiac catheterization with coronary angiography, and then a ROXANE, all in preparation for orthopedic surgery. He also reports some recent cognitive decline with short term memory loss, which could also be exacerbated by ROXANE. ROXANE would be high risk for numerous complications and a poor outcome. As I have explained to him and his family in detail, he is high risk for any surgery, including orthopedic surgery, but if that is what is necessary, it is at their discretion to proceed. As I also explained, with meticulous intraoperative monitoring, judicious volume management, and working with a cardiac anesthesiologist, we have seen many similar patients tolerate orthopedic surgery without cardiac incident (although he is still at high risk for surgery from a cardiac perspective).  Maxwell Watkins MD The Structural Heart team was asked to evaluate in the setting of low gradient aortic stenosis in a patient with a recent fall that is being considered for orthopedic surgical repair. The patient was aware of the AS for several months and sounds like he was offered intervention at a [facility] 2 months ago, which he declined. He has multiple fractures and is immobilized in a C-collar. The AV gradients are very low and the LV function is normal. This is not a patient who appears to be a reasonable candidate for a cardiac CT, cardiac catheterization with coronary angiography, and then a ROXANE, all in preparation for orthopedic surgery. He also reports some recent cognitive decline with short term memory loss, which could also be exacerbated by ROXANE. ROXANE would be high risk for numerous complications and a poor outcome. As I have explained to him and his family in detail, he is high risk for any surgery, including orthopedic surgery, but if that is what is necessary, it is at their discretion to proceed. As I also explained, with meticulous intraoperative monitoring (radial arterial line for continuous BP monitoring during and after induction), judicious volume management, and working with a cardiac anesthesiologist, we have seen many similar patients tolerate orthopedic surgery without cardiac incident (although he is still at high risk for surgery from a cardiac perspective).  Maxwell Watkins MD

## 2023-10-30 NOTE — CONSULT NOTE ADULT - ASSESSMENT
Stanton Tucker  89M hx afib, HTN, COPD, & CAD, presents after mechanical fall and CT C spine showing small fx at base of odontoid (type 2). Patient without neck complaints. Exam: RLE pain limited due to femoral fx, o/w intact    -No neurosurgical intervention  -C collar when OOB  -Outpatient FU with Dr. Carmel judge

## 2023-10-30 NOTE — CONSULT NOTE ADULT - SUBJECTIVE AND OBJECTIVE BOX
Stanton Tucker  89M hx afib, HTN, COPD, & CAD, presents after mechanical fall and CT C spine showing small fx at base of odontoid (type 2). Patient without neck complaints.    --Anticoagulation--  heparin   Injectable 5000 Unit(s) SubCutaneous every 8 hours    T(C): 36.5 (10-30-23 @ 00:00), Max: 36.5 (10-30-23 @ 00:00)  HR: 81 (10-30-23 @ 02:00) (77 - 87)  BP: 130/60 (10-30-23 @ 02:00) (115/54 - 160/74)  RR: 23 (10-30-23 @ 02:00) (10 - 29)  SpO2: 94% (10-30-23 @ 02:00) (83% - 100%)  Wt(kg): --    Exam: RLE pain limited due to femoral fx, o/w intact

## 2023-10-30 NOTE — CONSULT NOTE ADULT - ASSESSMENT
Mr Tucker is a 89y/o male with PMHx of afib (no AC), HTN, COPD, & CAD admitted s/p mechanical fall at home. He was found to have a R femoral neck fx & odontoid process fx. He also was found to have severe AS and was transferred to [facility] for evaluation.

## 2023-10-30 NOTE — PROVIDER CONTACT NOTE (HYPOGLYCEMIA EVENT) - NS PROVIDER CONTACT BACKGROUND-HYPO
Age: 89y    Gender: Male    POCT Blood Glucose:  102 mg/dL (10-30-23 @ 02:43)  113 mg/dL (10-29-23 @ 23:30)  94 mg/dL (10-29-23 @ 23:09)  95 mg/dL (10-29-23 @ 22:44)  45 mg/dL (10-29-23 @ 22:16)  51 mg/dL (10-29-23 @ 22:14)  74 mg/dL (10-29-23 @ 20:19)  35 mg/dL (10-29-23 @ 20:17)      eMAR:  dextrose 50% Injectable   50 milliLiter(s) IV Push (10-29-23 @ 22:20)

## 2023-10-30 NOTE — PROGRESS NOTE ADULT - SUBJECTIVE AND OBJECTIVE BOX
CRITICAL CARE ATTENDING - CTICU    MEDICATIONS  (STANDING):  atorvastatin 40 milliGRAM(s) Oral at bedtime  dextrose 5%. 1000 milliLiter(s) (30 mL/Hr) IV Continuous <Continuous>  heparin   Injectable 5000 Unit(s) SubCutaneous every 8 hours  influenza  Vaccine (HIGH DOSE) 0.7 milliLiter(s) IntraMuscular once  pantoprazole    Tablet 40 milliGRAM(s) Oral before breakfast  sodium chloride 0.9% lock flush 3 milliLiter(s) IV Push every 8 hours                                    10.3   9.32  )-----------( 213      ( 29 Oct 2023 19:24 )             32.1       10-29    141  |  108  |  43<H>  ----------------------------<  110<H>  4.8   |  20<L>  |  1.51<H>    Ca    9.1      29 Oct 2023 22:54  Phos  4.3     10-29  Mg     2.5     10-29    TPro  6.6  /  Alb  3.4  /  TBili  0.3  /  DBili  x   /  AST  30  /  ALT  24  /  AlkPhos  115  10-29      PT/INR - ( 29 Oct 2023 19:24 )   PT: 11.9 sec;   INR: 1.08 ratio         PTT - ( 29 Oct 2023 19:24 )  PTT:31.2 sec        Daily     Daily Weight in k.7 (30 Oct 2023 00:00)      10-29 @ 07:01  -  10-30 @ 05:42  --------------------------------------------------------  IN: 310 mL / OUT: 465 mL / NET: -155 mL        Critically Ill patient  : [x ] preoperative ,   [ ] post operative    Requires :  [ ] Arterial Line   [ ] Central Line  [ ] PA catheter  [ ] IABP  [ ] ECMO  [ ] LVAD  [ ] Ventilator  [ ] pacemaker [ ] Impella.                      [x ] ABG's     [ x] Pulse Oxymetry Monitoring  Bedside evaluation , monitoring , treatment of hemodynamics , fluids , IVP/ IVCD meds.        Diagnosis:     10/29- Pre-op for possible balloon angio aortic valve repair    Hypertension    Afib     Requires chest PT, pulmonary toilet, suctioning to maintain SaO2,  patent airway and treat atelectasis.     Requires bedside physical therapy, mobilization and total [facility] care.         I, Omar Hyman, personally performed the services described in this documentation. All medical record entries made by the scribe were at my direction and in my presence. I have reviewed the chart and agree that the record reflects my personal performance and is accurate and complete.   Omar Hyman MD.       By signing my name below, I, Alexander Fisher, attest that this documentation has been prepared under the direction and in the presence of Omar Hyman MD.   Electronically Signed: Gregorio Jefferson 10-30-23 @ 05:42        Discussed with CT surgeon, Physician Assistant - Nurse Practitioner- Critical care medicine team.   Dicussed at  AM / PM rounds.   Chart, labs , films reviewed.    Cumulative Critical Care Time Given Today:  CRITICAL CARE ATTENDING - CTICU    MEDICATIONS  (STANDING):  atorvastatin 40 milliGRAM(s) Oral at bedtime  dextrose 5%. 1000 milliLiter(s) (30 mL/Hr) IV Continuous <Continuous>  heparin   Injectable 5000 Unit(s) SubCutaneous every 8 hours  influenza  Vaccine (HIGH DOSE) 0.7 milliLiter(s) IntraMuscular once  pantoprazole    Tablet 40 milliGRAM(s) Oral before breakfast  sodium chloride 0.9% lock flush 3 milliLiter(s) IV Push every 8 hours                                    10.3   9.32  )-----------( 213      ( 29 Oct 2023 19:24 )             32.1       10-29    141  |  108  |  43<H>  ----------------------------<  110<H>  4.8   |  20<L>  |  1.51<H>    Ca    9.1      29 Oct 2023 22:54  Phos  4.3     10-29  Mg     2.5     10-29    TPro  6.6  /  Alb  3.4  /  TBili  0.3  /  DBili  x   /  AST  30  /  ALT  24  /  AlkPhos  115  10-29      PT/INR - ( 29 Oct 2023 19:24 )   PT: 11.9 sec;   INR: 1.08 ratio         PTT - ( 29 Oct 2023 19:24 )  PTT:31.2 sec        Daily     Daily Weight in k.7 (30 Oct 2023 00:00)      10-29 @ 07:01  -  10-30 @ 05:42  --------------------------------------------------------  IN: 310 mL / OUT: 465 mL / NET: -155 mL        Critically Ill patient  : [x ] preoperative ,   [ ] post operative    Requires :  [ ] Arterial Line   [ ] Central Line  [ ] PA catheter  [ ] IABP  [ ] ECMO  [ ] LVAD  [ ] Ventilator  [ ] pacemaker [ ] Impella.                      [x ] ABG's     [ x] Pulse Oxymetry Monitoring  Bedside evaluation , monitoring , treatment of hemodynamics , fluids , IVP/ IVCD meds.        Diagnosis:     Fall At Home     10/29- Pre-op for possible balloon angio aortic valve repair    CHF- acute [x ]   chronic [x ]    systolic [ ]   diastolic [x ]  Valvular [ ]          - Echo- EF -    AS         [ ] RV dysfunction          - Cxr-cardiomegally, edema          - Clinical-  [ ]inotropes   [ ]pressors   [x ]diuresis   [ ]IABP   [ ]ECMO   [ ]LVAD   [ ]Respiratory Failure         -     Hypertension    Afib     Requires chest PT, pulmonary toilet, suctioning to maintain SaO2,  patent airway and treat atelectasis.     Requires bedside physical therapy, mobilization and total halfway care.     COPD     A Fib.     h/o STEMI     Femur Fx    odontoid  Fx  - C -  Collar     Chronic Renal Failure - ? Renal Failure - Acute Kidney Injury  ?    Metabolic Acidosis           I, Omar Hyman, personally performed the services described in this documentation. All medical record entries made by the scribe were at my direction and in my presence. I have reviewed the chart and agree that the record reflects my personal performance and is accurate and complete.   Omar Hyman MD.       By signing my name below, I, Alexander Fisher, attest that this documentation has been prepared under the direction and in the presence of Omar Hyman MD.   Electronically Signed: Gregorio Jefferson 10-30-23 @ 05:42        Discussed with CT surgeon, Physician Assistant - Nurse Practitioner- Critical care medicine team.   Dicussed at  AM / PM rounds.   Chart, labs , films reviewed.    Cumulative Critical Care Time Given Today:  30 min

## 2023-10-30 NOTE — CONSULT NOTE ADULT - PROBLEM SELECTOR RECOMMENDATION 9
- echocardiogram done today and reviewed   - shows low flow low gradient severe AS - echocardiogram done today and reviewed   -- shows low flow low gradient severe AS  -- unclear by limited history if he has any attributable symptoms  - no indication for TAVR at this time  - high risk femur surgery as per ortho

## 2023-10-30 NOTE — CONSULT NOTE ADULT - SUBJECTIVE AND OBJECTIVE BOX
HPI  89y[facility] transferred from [facility] for [facility] of aortic stenosis. c/o R hip pain s/p mechanical fall. Unable to bear weight in the RLE since the fall. Denies headstrike or LOC. Denies numbness/tingling in the RLE. Denies any other trauma/injuries at this time. At baseline, community ambulator w/o assistive devices.    ROS  Negative unless otherwise specified in HPI.    PAST MEDICAL & SURGICAL Hx  PAST MEDICAL & SURGICAL HISTORY:  Unspecified atrial fibrillation      COPD without exacerbation      Hypertension      CAD (coronary artery disease)          MEDICATIONS  Home Medications:      ALLERGIES  Allergy Status Unknown      FAMILY Hx  FAMILY HISTORY:      SOCIAL Hx  Social History:      VITALS  Vital Signs Last 24 Hrs  T(C): 36.5 (30 Oct 2023 00:00), Max: 36.5 (30 Oct 2023 00:00)  T(F): 97.7 (30 Oct 2023 00:00), Max: 97.7 (30 Oct 2023 00:00)  HR: 81 (30 Oct 2023 02:00) (77 - 87)  BP: 130/60 (30 Oct 2023 02:00) (115/54 - 160/74)  BP(mean): 81 (30 Oct 2023 02:00) (78 - 106)  RR: 23 (30 Oct 2023 02:00) (10 - 29)  SpO2: 94% (30 Oct 2023 02:00) (83% - 100%)    Parameters below as of 30 Oct 2023 00:00  Patient On (Oxygen Delivery Method): nasal cannula  O2 Flow (L/min): 4      PHYSICAL EXAM  Gen: Lying in bed, NAD  Resp: No increased WOB  RLE:  Skin intact, shortened and externally rotated, +edema and +ecchymosis over R hip  +TTP over R hip, no TTP along remainder of extremity; compartments soft  Limited ROM at hip 2/2 pain  +Log roll test  +Pain with axial loading  Motor: TA/EHL/GS/FHL intact  Sensory: DP/SP/Tib/Staci/Saph SILT  +DP pulse, WWP    Secondary survey:  No TTP along spine or other extremities, pelvis grossly stable, SILT and compartments soft throughout    LABS                        10.3   9.32  )-----------( 213      ( 29 Oct 2023 19:24 )             32.1     10-29    141  |  108  |  43<H>  ----------------------------<  110<H>  4.8   |  20<L>  |  1.51<H>    Ca    9.1      29 Oct 2023 22:54  Phos  4.3     10-29  Mg     2.5     10-29    TPro  6.6  /  Alb  3.4  /  TBili  0.3  /  DBili  x   /  AST  30  /  ALT  24  /  AlkPhos  115  10-29    PT/INR - ( 29 Oct 2023 19:24 )   PT: 11.9 sec;   INR: 1.08 ratio         PTT - ( 29 Oct 2023 19:24 )  PTT:31.2 sec    IMAGING  XRs: R femoral neck fx (personal read)    ASSESSMENT & PLAN  89yMale w/ R femoral neck fx.  -NWB RLE, bedrest  -[anything unique to this pt]  -OR on _  -f/u preop: CBC, BMP, coags, T&S x2, CXR, EKG, COVID, hCG [women only]  -NPO past midnight, IVF  -hold chemical DVT ppx for OR; SCDs OK  -please document medical clearance ASAP for OR  -pain control  -ice/cold compress

## 2023-10-30 NOTE — PROGRESS NOTE ADULT - SUBJECTIVE AND OBJECTIVE BOX
VITAL SIGNS    Telemetry:      Vital Signs Last 24 Hrs  T(C): 36.6 (10-30-23 @ 10:59), Max: 36.6 (10-30-23 @ 10:59)  T(F): 97.8 (10-30-23 @ 10:59), Max: 97.8 (10-30-23 @ 10:59)  HR: 78 (10-30-23 @ 10:59) (70 - 87)  BP: 92/64 (10-30-23 @ 10:59) (79/44 - 160/74)  RR: 20 (10-30-23 @ 10:59) (10 - 35)  SpO2: 96% (10-30-23 @ 10:59) (83% - 100%)                   10-29 @ 07:01  -  10-30 @ 07:00  --------------------------------------------------------  IN: 370 mL / OUT: 510 mL / NET: -140 mL    10-30 @ 07:01  -  10-30 @ 13:57  --------------------------------------------------------  IN: 220 mL / OUT: 150 mL / NET: 70 mL          Daily     Daily Weight in k.7 (30 Oct 2023 00:00)            CAPILLARY BLOOD GLUCOSE  92 (30 Oct 2023 10:00)      POCT Blood Glucose.: 92 mg/dL (30 Oct 2023 10:11)  POCT Blood Glucose.: 100 mg/dL (30 Oct 2023 05:44)  POCT Blood Glucose.: 102 mg/dL (30 Oct 2023 02:43)  POCT Blood Glucose.: 113 mg/dL (29 Oct 2023 23:30)  POCT Blood Glucose.: 94 mg/dL (29 Oct 2023 23:09)  POCT Blood Glucose.: 95 mg/dL (29 Oct 2023 22:44)  POCT Blood Glucose.: 45 mg/dL (29 Oct 2023 22:16)  POCT Blood Glucose.: 51 mg/dL (29 Oct 2023 22:14)  POCT Blood Glucose.: 74 mg/dL (29 Oct 2023 20:19)  POCT Blood Glucose.: 35 mg/dL (29 Oct 2023 20:17)            Drains:     MS         [  ] Drainage:                 L Pleural  [  ]  Drainage:                R Pleural  [  ]  Drainage:    Pacing Wires        [  ]   Settings:                                  Isolated  [  ]    Coumadin    [ ] YES          [  ]      NO                                   PHYSICAL EXAM        Neurology:  Confused, a &o x 1, follows commands  CV : s1 s2 RRR    Lungs: cta  Abdomen: soft, nontender, nondistended, positive bowel sounds                        :    voiding       Extremities:    -  edema   /  -   calve tenderness ,             atorvastatin 40 milliGRAM(s) Oral at bedtime  ciprofloxacin     Tablet 250 milliGRAM(s) Oral daily  dextrose 5% + sodium chloride 0.45%. 1000 milliLiter(s) IV Continuous <Continuous>  heparin   Injectable 5000 Unit(s) SubCutaneous every 8 hours  influenza  Vaccine (HIGH DOSE) 0.7 milliLiter(s) IntraMuscular once  levothyroxine 25 MICROGram(s) Oral daily  mupirocin 2% Nasal 1 Application(s) Both Nostrils two times a day  pantoprazole    Tablet 40 milliGRAM(s) Oral before breakfast  sodium chloride 0.9% lock flush 3 milliLiter(s) IV Push every 8 hours                    Physical Therapy Rec:   Home  [  ]   Home w/ PT  [  ]  Rehab  [  ]  Discussed with Cardiothoracic Team at AM rounds.

## 2023-10-30 NOTE — CONSULT NOTE ADULT - SUBJECTIVE AND OBJECTIVE BOX
Structural Heart Team    HPI:  89M PMHx AF (no AC), HTN, COPD, & CAD, dry skin transferred presented to outside hospital s/p mechanical fall at home. XR Rt femur showed Rt femoral neck fx & CTH showed type II odontoid process fx. Remains in C-collar. Pt was also found to have aortic stenosis & was transferred to [facility] under Dr. Almeida for further workup & management. (29 Oct 2023 18:40)      Called to evaluate Mr Tucker for possible TAVR to treat his AS.       Allergies    No Known Allergies    Intolerances      PAST MEDICAL & SURGICAL HISTORY:  Unspecified atrial fibrillation    COPD without exacerbation    Hypertension    CAD (coronary artery disease)      MEDICATIONS  (STANDING):  atorvastatin 40 milliGRAM(s) Oral at bedtime  ciprofloxacin     Tablet 250 milliGRAM(s) Oral daily  dextrose 5% + sodium chloride 0.45%. 1000 milliLiter(s) (40 mL/Hr) IV Continuous <Continuous>  heparin   Injectable 5000 Unit(s) SubCutaneous every 8 hours  influenza  Vaccine (HIGH DOSE) 0.7 milliLiter(s) IntraMuscular once  levothyroxine 25 MICROGram(s) Oral daily  mupirocin 2% Nasal 1 Application(s) Both Nostrils two times a day  pantoprazole    Tablet 40 milliGRAM(s) Oral before breakfast  sodium chloride 0.9% lock flush 3 milliLiter(s) IV Push every 8 hours      REVIEW OF SYSTEMS:    CONSTITUTIONAL: No weakness, fevers or chills  EYES/ENT: No visual changes;  No vertigo or throat pain   NECK: No pain or stiffness  RESPIRATORY: No cough, wheezing, hemoptysis; No shortness of breath  CARDIOVASCULAR: No chest pain or palpitations  GASTROINTESTINAL: No abdominal or epigastric pain. No nausea, vomiting, or hematemesis; No diarrhea or constipation. No melena or hematochezia.  GENITOURINARY: No dysuria, frequency or hematuria  NEUROLOGICAL: No numbness or weakness  SKIN: No itching, rashes      Vital Signs Last 24 Hrs  T(C): 36.4 (30 Oct 2023 15:17), Max: 36.6 (30 Oct 2023 10:59)  T(F): 97.5 (30 Oct 2023 15:17), Max: 97.8 (30 Oct 2023 10:59)  HR: 80 (30 Oct 2023 15:17) (70 - 87)  BP: 110/64 (30 Oct 2023 15:17) (79/44 - 160/74)  BP(mean): 80 (30 Oct 2023 15:17) (57 - 106)  RR: 18 (30 Oct 2023 15:17) (10 - 35)  SpO2: 96% (30 Oct 2023 10:59) (83% - 100%)    Parameters below as of 30 Oct 2023 10:59  Patient On (Oxygen Delivery Method): nasal cannula                              10.3   9.32  )-----------( 213      ( 29 Oct 2023 19:24 )             32.1   10-29    141  |  108  |  43<H>  ----------------------------<  110<H>  4.8   |  20<L>  |  1.51<H>    Ca    9.1      29 Oct 2023 22:54  Phos  4.3     10-29  Mg     2.5     10-29    TPro  6.6  /  Alb  3.4  /  TBili  0.3  /  DBili  x   /  AST  30  /  ALT  24  /  AlkPhos  115  10-29    PT/INR - ( 29 Oct 2023 19:24 )   PT: 11.9 sec;   INR: 1.08 ratio         PTT - ( 29 Oct 2023 19:24 )  PTT:31.2 sec    I&O's Summary    29 Oct 2023 07:01  -  30 Oct 2023 07:00  --------------------------------------------------------  IN: 370 mL / OUT: 510 mL / NET: -140 mL    30 Oct 2023 07:01  -  30 Oct 2023 15:46  --------------------------------------------------------  IN: 220 mL / OUT: 150 mL / NET: 70 mL          Physical Exam  General:  Cardiac: s1s2, RR,  /   murmur  Pulmonary:   Gastrointestinal: soft abdomen, nontender, nondistended, + bowel sounds throughout  Extremities:   Neuro:  EKG:      < from: TTE W or WO Ultrasound Enhancing Agent (10.30.23 @ 08:32) >  CONCLUSIONS:      1. Technically difficult image quality.   2. Left ventricular cavity is small. Left ventricular wall thickness is normal. Left ventricular systolic function is normal with an ejection fraction of 63 % by Hickman's method of disks. There are no regional wall motion abnormalities seen.   3. Normal filling pressure. Analysis of left ventricular diastolic function and filling pressure is made challenging by the presence of atrial fibrillation.   4. Moderately enlarged right ventricular cavity size, wall thickness, and reduced systolic function.   5. The left atrium is severely dilated.   6. The right atrium is dilated in size.   7. There is moderate tricuspid regurgitation. Estimated pulmonary artery systolic pressure is 55 mmHg.   8. There is mild mitral regurgitation.   9. There is severe aortic stenosis. There is low flow, low gradient aortic stenosis with preserved EF. Left ventricular stroke volume is 32.2 ml ;left ventricular stroke volume index is 18.56 ml/m². The aortic valve acceleration time is 107 msec. The peak transaortic velocity is 3.26 m/s, peak transaortic gradient is 42.6 mmHg and mean transaortic gradient is 20.3 mmHg with an LVOT/aortic valve VTI ratio of 0.13. The aortic valve area is estimated at 0.41 cm² by the continuity equation. There is no evidence of aortic regurgitation.  10. No pericardial effusion seen.  11. No prior echocardiogram is available for comparison.    ________________________________________________________________________________________  FINDINGS:     Left Ventricle:  The left ventricular cavity is small. Left ventricular wall thickness is normal. Left ventricular systolic function is normal with a calculated ejection fraction of 63 % by the Hickman's biplane method of disks. There are no regional wall motion abnormalities seen. Normal filling pressure. Analysis of left ventricular diastolic function and filling pressure is made challenging by the presence of atrial fibrillation.     Right Ventricle:  The right ventricular cavity is moderately enlarged in size, normal wall thickness and reducedsystolic function.     Left Atrium:  There is low flow, low gradient aortic stenosis with preserved EF. The left atrium is severely dilated.     Right Atrium:  The right atrium was not well visualized. The right atrium is dilated in size.     Interatrial Septum:  The interatrial septum appears intact.     Aortic Valve:  The aortic valve is tricuspid with normal leaflet excursion with reduced systolic excursion. There is severe calcification of the aortic valve leaflets. There is severe aortic stenosis. There is low flow, low gradient aortic stenosis with preserved EF. Left ventricular stroke volume is 32.2 ml ;left ventricular stroke volume index is 18.56 ml/m². The aortic valve acceleration time is 107 msec. The peak transaortic velocity is 3.26 m/s, peak transaortic gradient is 42.6 mmHg and mean transaortic gradient is 20.3 mmHg with an LVOT/aortic valve VTI ratio of 0.13. The aortic valve acceleration time is 107 msec. The aortic valve area is estimated at 0.41 cm² by the continuity equation. There is no evidence of aortic regurgitation.     Mitral Valve:  Structurally normal mitral valve with normal leaflet excursion. There is calcification of the mitral valve annulus. There is mild leaflet calcification. There is no mitral valvestenosis. There is mild mitral regurgitation.     Tricuspid Valve:  Structurally normal tricuspid valve with normal leaflet excursion. There is moderate tricuspid regurgitation. Estimated pulmonary artery systolic pressure is 55 mmHg.     Pulmonic Valve:  Structurally normal pulmonic valve with normal leaflet excursion. There is trace pulmonic regurgitation.     Aorta:  The aortic annulus and aortic root appear normal in size.     Pericardium:  No pericardial effusion seen.     Systemic Veins:  The inferior vena cava is dilated measuring 2.40 cm in diameter, (dilated >2.1cm) with abnormal inspiratory collapse (abnormal <50%) consistent with elevated right atrial pressure (~15, range 10-20mmHg).  ____________________________________________________________________  Quantitative Data:  Left Ventricle Measurements: (Indexed to BSA)     IVSd (2D):   1.0 cm  LVPWd (2D):  1.0 cm  LVIDd (2D):  4.2 cm  LVIDs (2D):  3.4 cm  LV Mass:     135 g  78.0 g/m²  BiPlane LV EF%: 63 %     MV E Vmax:    0.63m/s  e' lateral:   10.70 cm/s  e' medial:    8.38 cm/s  E/e' lateral: 5.93  E/e' medial:  7.57  E/e' Average: 6.65    Aorta Measurements: (normal range) (Indexed to BSA)     Sinuses of Valsalva: 3.70 cm (3.1 - 3.7 cm)       Left Atrium Measurements: (Indexed to BSA)  LA Diam 2D: 4.30 cm  LA Vol BP:  103.0 ml. 59 ml/m².    Right Ventricle Measurements:     TV Selene. S': 6.85 cm/s       LVOT / RVOT/ Qp/Qs Data: (Indexed to BSA)  LVOT Diameter: 2.00 cm  LVOT Vmax:     0.55 m/s  LVOT VTI:      10.26 cm  LVOT SV:       32.2 ml    18.56 ml/m²  LVOT CO:       1.71 l/min 0.98 l/min/m²    Aortic Valve Measurements:  AV Vmax:           3.3 m/s  AV Peak Gradient:  42.6 mmHg  AV Mean Gradient:  20.3 mmHg  AV VTI:            79.6 cm  AV VTI Ratio:      0.13  AoV EOA, Contin:   0.41 cm²  AoV EOA, Contin i: 0.23 cm²/m²    Mitral Valve Measurements:     MV E Vmax: 0.6 m/s       Tricuspid Valve Measurements:     TR Vmax:          3.2 m/s  TR Peak Gradient: 39.9 mmHg  RA Pressure:      15 mmHg  PASP:    55 mmHg    < end of copied text >   Structural Heart Team    HPI:  89M PMHx AF (no AC), HTN, COPD, & CAD, dry skin transferred presented to outside hospital s/p mechanical fall at home. XR Rt femur showed Rt femoral neck fx & CTH showed type II odontoid process fx. Remains in C-collar. Pt was also found to have aortic stenosis & was transferred to [facility] under Dr. Almeida for further workup & management. (29 Oct 2023 18:40)      Called to evaluate Mr Tucker for possible TAVR to treat his AS. He is lying in bed with a c-collar in place and his son and a nurses' aide at the bedside. By history, he is relatively active given his advanced age (his son reports that he had recently been found doing his own laundry). He has been aware of his aortic stenosis and was reportedly offered TAVR a few months ago at a [facility] but Mr Tucker, along with his family, refused. They felt that his symptoms and advanced age didn't warrant any intervention. He has poor short term memory, which he is well aware of, but denies experiencing dyspnea, chest pain/pressure and lightheadedness.          Allergies    No Known Allergies    Intolerances      PAST MEDICAL & SURGICAL HISTORY:  Unspecified atrial fibrillation    COPD without exacerbation    Hypertension    CAD (coronary artery disease)      MEDICATIONS  (STANDING):  atorvastatin 40 milliGRAM(s) Oral at bedtime  ciprofloxacin     Tablet 250 milliGRAM(s) Oral daily  dextrose 5% + sodium chloride 0.45%. 1000 milliLiter(s) (40 mL/Hr) IV Continuous <Continuous>  heparin   Injectable 5000 Unit(s) SubCutaneous every 8 hours  influenza  Vaccine (HIGH DOSE) 0.7 milliLiter(s) IntraMuscular once  levothyroxine 25 MICROGram(s) Oral daily  mupirocin 2% Nasal 1 Application(s) Both Nostrils two times a day  pantoprazole    Tablet 40 milliGRAM(s) Oral before breakfast  sodium chloride 0.9% lock flush 3 milliLiter(s) IV Push every 8 hours      REVIEW OF SYSTEMS:    CONSTITUTIONAL: No weakness, fevers or chills  EYES/ENT: No visual changes;  No vertigo or throat pain   NECK: No pain or stiffness  RESPIRATORY: No cough, wheezing, hemoptysis; No shortness of breath  CARDIOVASCULAR: No chest pain or palpitations  GASTROINTESTINAL: No abdominal or epigastric pain. No nausea, vomiting, or hematemesis; No diarrhea or constipation. No melena or hematochezia.  GENITOURINARY: No dysuria, frequency or hematuria  NEUROLOGICAL: No numbness or weakness  SKIN: No itching, rashes      Vital Signs Last 24 Hrs  T(C): 36.4 (30 Oct 2023 15:17), Max: 36.6 (30 Oct 2023 10:59)  T(F): 97.5 (30 Oct 2023 15:17), Max: 97.8 (30 Oct 2023 10:59)  HR: 80 (30 Oct 2023 15:17) (70 - 87)  BP: 110/64 (30 Oct 2023 15:17) (79/44 - 160/74)  BP(mean): 80 (30 Oct 2023 15:17) (57 - 106)  RR: 18 (30 Oct 2023 15:17) (10 - 35)  SpO2: 96% (30 Oct 2023 10:59) (83% - 100%)    Parameters below as of 30 Oct 2023 10:59  Patient On (Oxygen Delivery Method): nasal cannula                              10.3   9.32  )-----------( 213      ( 29 Oct 2023 19:24 )             32.1   10-29    141  |  108  |  43<H>  ----------------------------<  110<H>  4.8   |  20<L>  |  1.51<H>    Ca    9.1      29 Oct 2023 22:54  Phos  4.3     10-29  Mg     2.5     10-29    TPro  6.6  /  Alb  3.4  /  TBili  0.3  /  DBili  x   /  AST  30  /  ALT  24  /  AlkPhos  115  10-29    PT/INR - ( 29 Oct 2023 19:24 )   PT: 11.9 sec;   INR: 1.08 ratio         PTT - ( 29 Oct 2023 19:24 )  PTT:31.2 sec    I&O's Summary    29 Oct 2023 07:01  -  30 Oct 2023 07:00  --------------------------------------------------------  IN: 370 mL / OUT: 510 mL / NET: -140 mL    30 Oct 2023 07:01  -  30 Oct 2023 15:46  --------------------------------------------------------  IN: 220 mL / OUT: 150 mL / NET: 70 mL          Physical Exam  General: NAD  Cardiac: s1s2, RRR, soft systolic murmur RUSB  Pulmonary: CTA b/l, no w/r/r anteriorly  Gastrointestinal: soft abdomen, nontender, nondistended, + bowel sounds throughout  Extremities: mild pitting ankle edema b/l, good distal pulses  Neuro: alert, nonfocal  EKG: pending      < from: TTE W or WO Ultrasound Enhancing Agent (10.30.23 @ 08:32) >  CONCLUSIONS:      1. Technically difficult image quality.   2. Left ventricular cavity is small. Left ventricular wall thickness is normal. Left ventricular systolic function is normal with an ejection fraction of 63 % by Hickman's method of disks. There are no regional wall motion abnormalities seen.   3. Normal filling pressure. Analysis of left ventricular diastolic function and filling pressure is made challenging by the presence of atrial fibrillation.   4. Moderately enlarged right ventricular cavity size, wall thickness, and reduced systolic function.   5. The left atrium is severely dilated.   6. The right atrium is dilated in size.   7. There is moderate tricuspid regurgitation. Estimated pulmonary artery systolic pressure is 55 mmHg.   8. There is mild mitral regurgitation.   9. There is severe aortic stenosis. There is low flow, low gradient aortic stenosis with preserved EF. Left ventricular stroke volume is 32.2 ml ;left ventricular stroke volume index is 18.56 ml/m². The aortic valve acceleration time is 107 msec. The peak transaortic velocity is 3.26 m/s, peak transaortic gradient is 42.6 mmHg and mean transaortic gradient is 20.3 mmHg with an LVOT/aortic valve VTI ratio of 0.13. The aortic valve area is estimated at 0.41 cm² by the continuity equation. There is no evidence of aortic regurgitation.  10. No pericardial effusion seen.  11. No prior echocardiogram is available for comparison.    ________________________________________________________________________________________  FINDINGS:     Left Ventricle:  The left ventricular cavity is small. Left ventricular wall thickness is normal. Left ventricular systolic function is normal with a calculated ejection fraction of 63 % by the Hickman's biplane method of disks. There are no regional wall motion abnormalities seen. Normal filling pressure. Analysis of left ventricular diastolic function and filling pressure is made challenging by the presence of atrial fibrillation.     Right Ventricle:  The right ventricular cavity is moderately enlarged in size, normal wall thickness and reducedsystolic function.     Left Atrium:  There is low flow, low gradient aortic stenosis with preserved EF. The left atrium is severely dilated.     Right Atrium:  The right atrium was not well visualized. The right atrium is dilated in size.     Interatrial Septum:  The interatrial septum appears intact.     Aortic Valve:  The aortic valve is tricuspid with normal leaflet excursion with reduced systolic excursion. There is severe calcification of the aortic valve leaflets. There is severe aortic stenosis. There is low flow, low gradient aortic stenosis with preserved EF. Left ventricular stroke volume is 32.2 ml ;left ventricular stroke volume index is 18.56 ml/m². The aortic valve acceleration time is 107 msec. The peak transaortic velocity is 3.26 m/s, peak transaortic gradient is 42.6 mmHg and mean transaortic gradient is 20.3 mmHg with an LVOT/aortic valve VTI ratio of 0.13. The aortic valve acceleration time is 107 msec. The aortic valve area is estimated at 0.41 cm² by the continuity equation. There is no evidence of aortic regurgitation.     Mitral Valve:  Structurally normal mitral valve with normal leaflet excursion. There is calcification of the mitral valve annulus. There is mild leaflet calcification. There is no mitral valvestenosis. There is mild mitral regurgitation.     Tricuspid Valve:  Structurally normal tricuspid valve with normal leaflet excursion. There is moderate tricuspid regurgitation. Estimated pulmonary artery systolic pressure is 55 mmHg.     Pulmonic Valve:  Structurally normal pulmonic valve with normal leaflet excursion. There is trace pulmonic regurgitation.     Aorta:  The aortic annulus and aortic root appear normal in size.     Pericardium:  No pericardial effusion seen.     Systemic Veins:  The inferior vena cava is dilated measuring 2.40 cm in diameter, (dilated >2.1cm) with abnormal inspiratory collapse (abnormal <50%) consistent with elevated right atrial pressure (~15, range 10-20mmHg).  ____________________________________________________________________  Quantitative Data:  Left Ventricle Measurements: (Indexed to BSA)     IVSd (2D):   1.0 cm  LVPWd (2D):  1.0 cm  LVIDd (2D):  4.2 cm  LVIDs (2D):  3.4 cm  LV Mass:     135 g  78.0 g/m²  BiPlane LV EF%: 63 %     MV E Vmax:    0.63m/s  e' lateral:   10.70 cm/s  e' medial:    8.38 cm/s  E/e' lateral: 5.93  E/e' medial:  7.57  E/e' Average: 6.65    Aorta Measurements: (normal range) (Indexed to BSA)     Sinuses of Valsalva: 3.70 cm (3.1 - 3.7 cm)       Left Atrium Measurements: (Indexed to BSA)  LA Diam 2D: 4.30 cm  LA Vol BP:  103.0 ml. 59 ml/m².    Right Ventricle Measurements:     TV Selene. S': 6.85 cm/s       LVOT / RVOT/ Qp/Qs Data: (Indexed to BSA)  LVOT Diameter: 2.00 cm  LVOT Vmax:     0.55 m/s  LVOT VTI:      10.26 cm  LVOT SV:       32.2 ml    18.56 ml/m²  LVOT CO:       1.71 l/min 0.98 l/min/m²    Aortic Valve Measurements:  AV Vmax:           3.3 m/s  AV Peak Gradient:  42.6 mmHg  AV Mean Gradient:  20.3 mmHg  AV VTI:            79.6 cm  AV VTI Ratio:      0.13  AoV EOA, Contin:   0.41 cm²  AoV EOA, Contin i: 0.23 cm²/m²    Mitral Valve Measurements:     MV E Vmax: 0.6 m/s       Tricuspid Valve Measurements:     TR Vmax:          3.2 m/s  TR Peak Gradient: 39.9 mmHg  RA Pressure:      15 mmHg  PASP:    55 mmHg    < end of copied text >

## 2023-10-31 ENCOUNTER — APPOINTMENT (OUTPATIENT)
Dept: ORTHOPEDIC SURGERY | Facility: HOSPITAL | Age: 88
End: 2023-10-31

## 2023-10-31 LAB
ANION GAP SERPL CALC-SCNC: 12 MMOL/L — SIGNIFICANT CHANGE UP (ref 5–17)
ANION GAP SERPL CALC-SCNC: 12 MMOL/L — SIGNIFICANT CHANGE UP (ref 5–17)
ANION GAP SERPL CALC-SCNC: 14 MMOL/L — SIGNIFICANT CHANGE UP (ref 5–17)
ANION GAP SERPL CALC-SCNC: 14 MMOL/L — SIGNIFICANT CHANGE UP (ref 5–17)
APPEARANCE UR: CLEAR — SIGNIFICANT CHANGE UP
APPEARANCE UR: CLEAR — SIGNIFICANT CHANGE UP
APTT BLD: 29.9 SEC — SIGNIFICANT CHANGE UP (ref 24.5–35.6)
APTT BLD: 29.9 SEC — SIGNIFICANT CHANGE UP (ref 24.5–35.6)
BACTERIA # UR AUTO: NEGATIVE /HPF — SIGNIFICANT CHANGE UP
BACTERIA # UR AUTO: NEGATIVE /HPF — SIGNIFICANT CHANGE UP
BILIRUB UR-MCNC: NEGATIVE — SIGNIFICANT CHANGE UP
BILIRUB UR-MCNC: NEGATIVE — SIGNIFICANT CHANGE UP
BLD GP AB SCN SERPL QL: NEGATIVE — SIGNIFICANT CHANGE UP
BLD GP AB SCN SERPL QL: NEGATIVE — SIGNIFICANT CHANGE UP
BUN SERPL-MCNC: 40 MG/DL — HIGH (ref 7–23)
BUN SERPL-MCNC: 40 MG/DL — HIGH (ref 7–23)
BUN SERPL-MCNC: 43 MG/DL — HIGH (ref 7–23)
BUN SERPL-MCNC: 43 MG/DL — HIGH (ref 7–23)
CALCIUM SERPL-MCNC: 8.7 MG/DL — SIGNIFICANT CHANGE UP (ref 8.4–10.5)
CALCIUM SERPL-MCNC: 8.7 MG/DL — SIGNIFICANT CHANGE UP (ref 8.4–10.5)
CALCIUM SERPL-MCNC: 9.1 MG/DL — SIGNIFICANT CHANGE UP (ref 8.4–10.5)
CALCIUM SERPL-MCNC: 9.1 MG/DL — SIGNIFICANT CHANGE UP (ref 8.4–10.5)
CAST: 2 /LPF — SIGNIFICANT CHANGE UP (ref 0–4)
CAST: 2 /LPF — SIGNIFICANT CHANGE UP (ref 0–4)
CHLORIDE SERPL-SCNC: 110 MMOL/L — HIGH (ref 96–108)
CHLORIDE SERPL-SCNC: 110 MMOL/L — HIGH (ref 96–108)
CHLORIDE SERPL-SCNC: 111 MMOL/L — HIGH (ref 96–108)
CHLORIDE SERPL-SCNC: 111 MMOL/L — HIGH (ref 96–108)
CO2 SERPL-SCNC: 19 MMOL/L — LOW (ref 22–31)
CO2 SERPL-SCNC: 19 MMOL/L — LOW (ref 22–31)
CO2 SERPL-SCNC: 20 MMOL/L — LOW (ref 22–31)
CO2 SERPL-SCNC: 20 MMOL/L — LOW (ref 22–31)
COLOR SPEC: YELLOW — SIGNIFICANT CHANGE UP
COLOR SPEC: YELLOW — SIGNIFICANT CHANGE UP
CREAT SERPL-MCNC: 1.37 MG/DL — HIGH (ref 0.5–1.3)
CREAT SERPL-MCNC: 1.37 MG/DL — HIGH (ref 0.5–1.3)
CREAT SERPL-MCNC: 1.54 MG/DL — HIGH (ref 0.5–1.3)
CREAT SERPL-MCNC: 1.54 MG/DL — HIGH (ref 0.5–1.3)
CULTURE RESULTS: ABNORMAL
CULTURE RESULTS: ABNORMAL
DIFF PNL FLD: ABNORMAL
DIFF PNL FLD: ABNORMAL
EGFR: 40 ML/MIN/1.73M2 — LOW
EGFR: 40 ML/MIN/1.73M2 — LOW
EGFR: 45 ML/MIN/1.73M2 — LOW
EGFR: 45 ML/MIN/1.73M2 — LOW
GLUCOSE BLDC GLUCOMTR-MCNC: 100 MG/DL — HIGH (ref 70–99)
GLUCOSE BLDC GLUCOMTR-MCNC: 100 MG/DL — HIGH (ref 70–99)
GLUCOSE SERPL-MCNC: 100 MG/DL — HIGH (ref 70–99)
GLUCOSE SERPL-MCNC: 100 MG/DL — HIGH (ref 70–99)
GLUCOSE SERPL-MCNC: 99 MG/DL — SIGNIFICANT CHANGE UP (ref 70–99)
GLUCOSE SERPL-MCNC: 99 MG/DL — SIGNIFICANT CHANGE UP (ref 70–99)
GLUCOSE UR QL: NEGATIVE MG/DL — SIGNIFICANT CHANGE UP
GLUCOSE UR QL: NEGATIVE MG/DL — SIGNIFICANT CHANGE UP
HCT VFR BLD CALC: 28.3 % — LOW (ref 39–50)
HCT VFR BLD CALC: 28.3 % — LOW (ref 39–50)
HCT VFR BLD CALC: 30.8 % — LOW (ref 39–50)
HCT VFR BLD CALC: 30.8 % — LOW (ref 39–50)
HGB BLD-MCNC: 10.1 G/DL — LOW (ref 13–17)
HGB BLD-MCNC: 10.1 G/DL — LOW (ref 13–17)
HGB BLD-MCNC: 8.9 G/DL — LOW (ref 13–17)
HGB BLD-MCNC: 8.9 G/DL — LOW (ref 13–17)
INR BLD: 1.17 RATIO — SIGNIFICANT CHANGE UP (ref 0.85–1.18)
INR BLD: 1.17 RATIO — SIGNIFICANT CHANGE UP (ref 0.85–1.18)
KETONES UR-MCNC: ABNORMAL MG/DL
KETONES UR-MCNC: ABNORMAL MG/DL
LEUKOCYTE ESTERASE UR-ACNC: ABNORMAL
LEUKOCYTE ESTERASE UR-ACNC: ABNORMAL
MCHC RBC-ENTMCNC: 28.3 PG — SIGNIFICANT CHANGE UP (ref 27–34)
MCHC RBC-ENTMCNC: 28.3 PG — SIGNIFICANT CHANGE UP (ref 27–34)
MCHC RBC-ENTMCNC: 29.3 PG — SIGNIFICANT CHANGE UP (ref 27–34)
MCHC RBC-ENTMCNC: 29.3 PG — SIGNIFICANT CHANGE UP (ref 27–34)
MCHC RBC-ENTMCNC: 31.4 GM/DL — LOW (ref 32–36)
MCHC RBC-ENTMCNC: 31.4 GM/DL — LOW (ref 32–36)
MCHC RBC-ENTMCNC: 32.8 GM/DL — SIGNIFICANT CHANGE UP (ref 32–36)
MCHC RBC-ENTMCNC: 32.8 GM/DL — SIGNIFICANT CHANGE UP (ref 32–36)
MCV RBC AUTO: 89.3 FL — SIGNIFICANT CHANGE UP (ref 80–100)
MCV RBC AUTO: 89.3 FL — SIGNIFICANT CHANGE UP (ref 80–100)
MCV RBC AUTO: 90.1 FL — SIGNIFICANT CHANGE UP (ref 80–100)
MCV RBC AUTO: 90.1 FL — SIGNIFICANT CHANGE UP (ref 80–100)
NITRITE UR-MCNC: NEGATIVE — SIGNIFICANT CHANGE UP
NITRITE UR-MCNC: NEGATIVE — SIGNIFICANT CHANGE UP
NRBC # BLD: 0 /100 WBCS — SIGNIFICANT CHANGE UP (ref 0–0)
PH UR: 6 — SIGNIFICANT CHANGE UP (ref 5–8)
PH UR: 6 — SIGNIFICANT CHANGE UP (ref 5–8)
PLATELET # BLD AUTO: 205 K/UL — SIGNIFICANT CHANGE UP (ref 150–400)
PLATELET # BLD AUTO: 205 K/UL — SIGNIFICANT CHANGE UP (ref 150–400)
PLATELET # BLD AUTO: 206 K/UL — SIGNIFICANT CHANGE UP (ref 150–400)
PLATELET # BLD AUTO: 206 K/UL — SIGNIFICANT CHANGE UP (ref 150–400)
POTASSIUM SERPL-MCNC: 4.2 MMOL/L — SIGNIFICANT CHANGE UP (ref 3.5–5.3)
POTASSIUM SERPL-MCNC: 4.2 MMOL/L — SIGNIFICANT CHANGE UP (ref 3.5–5.3)
POTASSIUM SERPL-MCNC: 4.6 MMOL/L — SIGNIFICANT CHANGE UP (ref 3.5–5.3)
POTASSIUM SERPL-MCNC: 4.6 MMOL/L — SIGNIFICANT CHANGE UP (ref 3.5–5.3)
POTASSIUM SERPL-SCNC: 4.2 MMOL/L — SIGNIFICANT CHANGE UP (ref 3.5–5.3)
POTASSIUM SERPL-SCNC: 4.2 MMOL/L — SIGNIFICANT CHANGE UP (ref 3.5–5.3)
POTASSIUM SERPL-SCNC: 4.6 MMOL/L — SIGNIFICANT CHANGE UP (ref 3.5–5.3)
POTASSIUM SERPL-SCNC: 4.6 MMOL/L — SIGNIFICANT CHANGE UP (ref 3.5–5.3)
PROT UR-MCNC: SIGNIFICANT CHANGE UP MG/DL
PROT UR-MCNC: SIGNIFICANT CHANGE UP MG/DL
PROTHROM AB SERPL-ACNC: 12.2 SEC — SIGNIFICANT CHANGE UP (ref 9.5–13)
PROTHROM AB SERPL-ACNC: 12.2 SEC — SIGNIFICANT CHANGE UP (ref 9.5–13)
RBC # BLD: 3.14 M/UL — LOW (ref 4.2–5.8)
RBC # BLD: 3.14 M/UL — LOW (ref 4.2–5.8)
RBC # BLD: 3.45 M/UL — LOW (ref 4.2–5.8)
RBC # BLD: 3.45 M/UL — LOW (ref 4.2–5.8)
RBC # FLD: 18 % — HIGH (ref 10.3–14.5)
RBC # FLD: 18 % — HIGH (ref 10.3–14.5)
RBC # FLD: 18.1 % — HIGH (ref 10.3–14.5)
RBC # FLD: 18.1 % — HIGH (ref 10.3–14.5)
RBC CASTS # UR COMP ASSIST: 67 /HPF — HIGH (ref 0–4)
RBC CASTS # UR COMP ASSIST: 67 /HPF — HIGH (ref 0–4)
RH IG SCN BLD-IMP: POSITIVE — SIGNIFICANT CHANGE UP
RH IG SCN BLD-IMP: POSITIVE — SIGNIFICANT CHANGE UP
SODIUM SERPL-SCNC: 142 MMOL/L — SIGNIFICANT CHANGE UP (ref 135–145)
SODIUM SERPL-SCNC: 142 MMOL/L — SIGNIFICANT CHANGE UP (ref 135–145)
SODIUM SERPL-SCNC: 144 MMOL/L — SIGNIFICANT CHANGE UP (ref 135–145)
SODIUM SERPL-SCNC: 144 MMOL/L — SIGNIFICANT CHANGE UP (ref 135–145)
SP GR SPEC: 1.02 — SIGNIFICANT CHANGE UP (ref 1–1.03)
SP GR SPEC: 1.02 — SIGNIFICANT CHANGE UP (ref 1–1.03)
SPECIMEN SOURCE: SIGNIFICANT CHANGE UP
SPECIMEN SOURCE: SIGNIFICANT CHANGE UP
SQUAMOUS # UR AUTO: 0 /HPF — SIGNIFICANT CHANGE UP (ref 0–5)
SQUAMOUS # UR AUTO: 0 /HPF — SIGNIFICANT CHANGE UP (ref 0–5)
UROBILINOGEN FLD QL: 0.2 MG/DL — SIGNIFICANT CHANGE UP (ref 0.2–1)
UROBILINOGEN FLD QL: 0.2 MG/DL — SIGNIFICANT CHANGE UP (ref 0.2–1)
WBC # BLD: 7 K/UL — SIGNIFICANT CHANGE UP (ref 3.8–10.5)
WBC # BLD: 7 K/UL — SIGNIFICANT CHANGE UP (ref 3.8–10.5)
WBC # BLD: 7.02 K/UL — SIGNIFICANT CHANGE UP (ref 3.8–10.5)
WBC # BLD: 7.02 K/UL — SIGNIFICANT CHANGE UP (ref 3.8–10.5)
WBC # FLD AUTO: 7 K/UL — SIGNIFICANT CHANGE UP (ref 3.8–10.5)
WBC # FLD AUTO: 7 K/UL — SIGNIFICANT CHANGE UP (ref 3.8–10.5)
WBC # FLD AUTO: 7.02 K/UL — SIGNIFICANT CHANGE UP (ref 3.8–10.5)
WBC # FLD AUTO: 7.02 K/UL — SIGNIFICANT CHANGE UP (ref 3.8–10.5)
WBC UR QL: 11 /HPF — HIGH (ref 0–5)
WBC UR QL: 11 /HPF — HIGH (ref 0–5)

## 2023-10-31 PROCEDURE — 27236 TREAT THIGH FRACTURE: CPT | Mod: RT

## 2023-10-31 PROCEDURE — 72170 X-RAY EXAM OF PELVIS: CPT | Mod: 26

## 2023-10-31 PROCEDURE — 99233 SBSQ HOSP IP/OBS HIGH 50: CPT | Mod: FS

## 2023-10-31 DEVICE — STEM HIP 132DEG SZ6 35X111MM V40: Type: IMPLANTABLE DEVICE | Site: RIGHT | Status: FUNCTIONAL

## 2023-10-31 DEVICE — HEAD FEMORAL: Type: IMPLANTABLE DEVICE | Site: RIGHT | Status: FUNCTIONAL

## 2023-10-31 DEVICE — HEAD UNIVERSAL: Type: IMPLANTABLE DEVICE | Site: RIGHT | Status: FUNCTIONAL

## 2023-10-31 DEVICE — KIT A-LINE 1LUM 20G X 12CM SAFE KIT: Type: IMPLANTABLE DEVICE | Site: RIGHT | Status: FUNCTIONAL

## 2023-10-31 RX ORDER — ACETAMINOPHEN 500 MG
975 TABLET ORAL EVERY 8 HOURS
Refills: 0 | Status: DISCONTINUED | OUTPATIENT
Start: 2023-11-01 | End: 2023-11-01

## 2023-10-31 RX ORDER — SENNA PLUS 8.6 MG/1
2 TABLET ORAL AT BEDTIME
Refills: 0 | Status: DISCONTINUED | OUTPATIENT
Start: 2023-10-31 | End: 2023-11-07

## 2023-10-31 RX ORDER — SODIUM CHLORIDE 9 MG/ML
1000 INJECTION INTRAMUSCULAR; INTRAVENOUS; SUBCUTANEOUS
Refills: 0 | Status: DISCONTINUED | OUTPATIENT
Start: 2023-10-31 | End: 2023-11-02

## 2023-10-31 RX ORDER — ASPIRIN/CALCIUM CARB/MAGNESIUM 324 MG
81 TABLET ORAL DAILY
Refills: 0 | Status: DISCONTINUED | OUTPATIENT
Start: 2023-11-01 | End: 2023-11-07

## 2023-10-31 RX ORDER — POLYETHYLENE GLYCOL 3350 17 G/17G
17 POWDER, FOR SOLUTION ORAL DAILY
Refills: 0 | Status: DISCONTINUED | OUTPATIENT
Start: 2023-10-31 | End: 2023-11-07

## 2023-10-31 RX ORDER — TRAMADOL HYDROCHLORIDE 50 MG/1
25 TABLET ORAL EVERY 8 HOURS
Refills: 0 | Status: DISCONTINUED | OUTPATIENT
Start: 2023-10-31 | End: 2023-10-31

## 2023-10-31 RX ORDER — ACETAMINOPHEN 500 MG
1000 TABLET ORAL ONCE
Refills: 0 | Status: DISCONTINUED | OUTPATIENT
Start: 2023-10-31 | End: 2023-11-01

## 2023-10-31 RX ORDER — SODIUM CHLORIDE 9 MG/ML
250 INJECTION INTRAMUSCULAR; INTRAVENOUS; SUBCUTANEOUS ONCE
Refills: 0 | Status: COMPLETED | OUTPATIENT
Start: 2023-10-31 | End: 2023-10-31

## 2023-10-31 RX ORDER — LIDOCAINE 4 G/100G
1 CREAM TOPICAL DAILY
Refills: 0 | Status: COMPLETED | OUTPATIENT
Start: 2023-10-31 | End: 2023-11-03

## 2023-10-31 RX ORDER — CEFAZOLIN SODIUM 1 G
2000 VIAL (EA) INJECTION EVERY 8 HOURS
Refills: 0 | Status: COMPLETED | OUTPATIENT
Start: 2023-11-01 | End: 2023-11-01

## 2023-10-31 RX ORDER — OXYCODONE HYDROCHLORIDE 5 MG/1
5 TABLET ORAL EVERY 4 HOURS
Refills: 0 | Status: DISCONTINUED | OUTPATIENT
Start: 2023-10-31 | End: 2023-10-31

## 2023-10-31 RX ORDER — ENOXAPARIN SODIUM 100 MG/ML
40 INJECTION SUBCUTANEOUS EVERY 24 HOURS
Refills: 0 | Status: DISCONTINUED | OUTPATIENT
Start: 2023-11-01 | End: 2023-11-05

## 2023-10-31 RX ORDER — ACETAMINOPHEN 500 MG
1000 TABLET ORAL ONCE
Refills: 0 | Status: COMPLETED | OUTPATIENT
Start: 2023-11-01 | End: 2023-11-01

## 2023-10-31 RX ORDER — ONDANSETRON 8 MG/1
4 TABLET, FILM COATED ORAL EVERY 6 HOURS
Refills: 0 | Status: DISCONTINUED | OUTPATIENT
Start: 2023-10-31 | End: 2023-11-07

## 2023-10-31 RX ORDER — ACETAMINOPHEN 500 MG
1000 TABLET ORAL ONCE
Refills: 0 | Status: COMPLETED | OUTPATIENT
Start: 2023-10-31 | End: 2023-10-31

## 2023-10-31 RX ORDER — TRAMADOL HYDROCHLORIDE 50 MG/1
50 TABLET ORAL EVERY 8 HOURS
Refills: 0 | Status: DISCONTINUED | OUTPATIENT
Start: 2023-10-31 | End: 2023-10-31

## 2023-10-31 RX ORDER — ENOXAPARIN SODIUM 100 MG/ML
40 INJECTION SUBCUTANEOUS EVERY 24 HOURS
Refills: 0 | Status: DISCONTINUED | OUTPATIENT
Start: 2023-10-31 | End: 2023-10-31

## 2023-10-31 RX ORDER — ONDANSETRON 8 MG/1
4 TABLET, FILM COATED ORAL ONCE
Refills: 0 | Status: DISCONTINUED | OUTPATIENT
Start: 2023-10-31 | End: 2023-11-01

## 2023-10-31 RX ORDER — ACETAMINOPHEN 500 MG
975 TABLET ORAL EVERY 8 HOURS
Refills: 0 | Status: DISCONTINUED | OUTPATIENT
Start: 2023-10-31 | End: 2023-10-31

## 2023-10-31 RX ORDER — OXYCODONE HYDROCHLORIDE 5 MG/1
2.5 TABLET ORAL EVERY 4 HOURS
Refills: 0 | Status: DISCONTINUED | OUTPATIENT
Start: 2023-10-31 | End: 2023-10-31

## 2023-10-31 RX ORDER — CEFTRIAXONE 500 MG/1
2000 INJECTION, POWDER, FOR SOLUTION INTRAMUSCULAR; INTRAVENOUS ONCE
Refills: 0 | Status: COMPLETED | OUTPATIENT
Start: 2023-10-31 | End: 2023-10-31

## 2023-10-31 RX ADMIN — PANTOPRAZOLE SODIUM 40 MILLIGRAM(S): 20 TABLET, DELAYED RELEASE ORAL at 05:48

## 2023-10-31 RX ADMIN — MUPIROCIN 1 APPLICATION(S): 20 OINTMENT TOPICAL at 05:48

## 2023-10-31 RX ADMIN — SODIUM CHLORIDE 75 MILLILITER(S): 9 INJECTION INTRAMUSCULAR; INTRAVENOUS; SUBCUTANEOUS at 20:01

## 2023-10-31 RX ADMIN — Medication 1000 MILLIGRAM(S): at 21:45

## 2023-10-31 RX ADMIN — CEFTRIAXONE 100 MILLIGRAM(S): 500 INJECTION, POWDER, FOR SOLUTION INTRAMUSCULAR; INTRAVENOUS at 03:45

## 2023-10-31 RX ADMIN — Medication 400 MILLIGRAM(S): at 21:04

## 2023-10-31 RX ADMIN — SODIUM CHLORIDE 500 MILLILITER(S): 9 INJECTION INTRAMUSCULAR; INTRAVENOUS; SUBCUTANEOUS at 11:37

## 2023-10-31 RX ADMIN — SODIUM CHLORIDE 40 MILLILITER(S): 9 INJECTION, SOLUTION INTRAVENOUS at 11:37

## 2023-10-31 RX ADMIN — Medication 25 MICROGRAM(S): at 05:48

## 2023-10-31 RX ADMIN — SODIUM CHLORIDE 3 MILLILITER(S): 9 INJECTION INTRAMUSCULAR; INTRAVENOUS; SUBCUTANEOUS at 05:00

## 2023-10-31 NOTE — PRE-ANESTHESIA EVALUATION ADULT - NSANTHPMHFT_GEN_ALL_CORE
89M PMHx AF (no AC), HTN, COPD, & CAD, dry skin transferred presented to outside hospital s/p mechanical fall at home. XR Rt femur showed Rt femoral neck fx & CTH showed type II odontoid process fx. . Pt was also found to have aortic stenosis & was transferred to [facility] for TAVr evaluation.  He has been aware of his aortic stenosis and was reportedly offered TAVR a few months ago at a [facility] but Mr Tucker, along with his family, refused. Currently not pursuing TAVR although family is requesting surgical intervention for the hip fracture due to his prior capabilities. Currently not in cervical collar.

## 2023-10-31 NOTE — CHART NOTE - NSCHARTNOTEFT_GEN_A_CORE
Sleeping  appears comfortable    No Chest Pain, SOB, N/V.    T(C): 36.4 (10-31-23 @ 17:50), Max: 36.7 (10-31-23 @ 02:07)  HR: 86 (10-31-23 @ 20:30) (53 - 101)  BP: 128/83 (10-31-23 @ 20:30) (114/68 - 161/76)  RR: 14 (10-31-23 @ 20:30) (14 - 18)  SpO2: 98% (10-31-23 @ 20:30) (92% - 100%)  Wt(kg): --    Exam:  Sleeping but arousable  non-agitated; No Acute Distress  Pulm: CTAB  Abdomen soft / benign  Sosa  [y ]   EXT   RLE         Aquacel dressing C/D [ ]        Calves soft       (+) motor/sensory light touch        slightly cool digits        DP  pulses via dopplers    Xray:---- prosthesis in good alignment                           8.9<L>  7.00  )-----------( 206      ( 31 Oct 2023 18:55 )             28.3<L>     10-31    144  |  111<H>  |  40<H>  ----------------------------<  100<H>  4.2   |  19<L>  |  1.37<H>    A/P: S/p Hemiarthroplasty, R hip    -  SICU note appreciated      -- OK transfer to floor      -- Careful management of IVF   - D/W CT surgery      - NO intervention at this time - asked to write a sign-off note       - Transfer to Ortho Svce ( OK w/ Ortho Attending)  - Neuro note appreciated re: Odontoid Fx  -  Repeat U/A U/Cx sent  -  Chk AM Labs x 3 days  -  -PT/OT-WBAT-RLE - anterior hip precautions  -DVT PPx: Lovenox QD / ASA 81 mg QD  -Pain Control PO/IV Pain Rx    ** As per Attending: NO narcotics or Ultram        IV/PO Tylenol only  - Medical co-management      Dr Dunn et al. to see pt in am  - Spoke with son     ** Will bring in Patient's Rx and bottles in am         [Takes Diuretic & H20 pill]  - Monitor closely      ***See Above  Manish LOUISE  Orthopedics  B: [id number] Sleeping  appears comfortable    No Chest Pain, SOB, N/V.    T(C): 36.4 (10-31-23 @ 17:50), Max: 36.7 (10-31-23 @ 02:07)  HR: 86 (10-31-23 @ 20:30) (53 - 101)  BP: 128/83 (10-31-23 @ 20:30) (114/68 - 161/76)  RR: 14 (10-31-23 @ 20:30) (14 - 18)  SpO2: 98% (10-31-23 @ 20:30) (92% - 100%)  Wt(kg): --    Exam:  Sleeping but arousable  non-agitated; No Acute Distress  Pulm: CTAB  Abdomen soft / benign  Sosa  [y ]   EXT   RLE         Aquacel dressing C/D [ ]        Calves soft       (+) motor/sensory light touch        slightly cool digits        DP  pulses via dopplers    Xray:---- prosthesis in good alignment                           8.9<L>  7.00  )-----------( 206      ( 31 Oct 2023 18:55 )             28.3<L>     10-31    144  |  111<H>  |  40<H>  ----------------------------<  100<H>  4.2   |  19<L>  |  1.37<H>    A/P: S/p Hemiarthroplasty, R hip    -  SICU note appreciated      -- OK transfer to floor      -- Careful management of IVF   - D/W CT surgery      - NO intervention at this time - asked to write a sign-off note       - Transfer to Ortho Svce ( OK w/ Ortho Attending)  - Neuro note appreciated re: Odontoid Fx         * Collar ordered         ** to be worn when OOB  -  Repeat U/A U/Cx sent  -  Chk AM Labs x 3 days  -  -PT/OT-WBAT-RLE - anterior hip precautions  -DVT PPx: Lovenox QD / ASA 81 mg QD  -Pain Control PO/IV Pain Rx    ** As per Attending: NO narcotics or Ultram        IV/PO Tylenol only  - Medical co-management      Dr Dunn et al. to see pt in am  - Spoke with son     ** Will bring in Patient's Rx and bottles in am         [Takes Diuretic & H20 pill]  - Monitor closely      ***See Above  Manish LOUISE  Orthopedics  B: [id number]

## 2023-10-31 NOTE — CONSULT NOTE ADULT - SUBJECTIVE AND OBJECTIVE BOX
HISTORY OF PRESENT ILLNESS: IRON IBARRA is a 89y Male transferred from outside hospital s/p mechanical fall at home. XR Rt femur showed Rt femoral neck fx & CTH showed type II odontoid process fx. Pt was also found to have aortic stenosis & was transferred to [facility] under Dr. Almeida for further workup & management. No intervention for odontoid process fracture. Pt had Right hip hemiarthroplasty and required 0.5 phenylephrine intraop but off of it post op. Pt was not intubated and received spinal anesthesia.        PAST MEDICAL HISTORY:   Unspecified atrial fibrillation  COPD without exacerbation  Hypertension  CAD (coronary artery disease)        PAST SURGICAL HISTORY:     FAMILY HISTORY:     SOCIAL HISTORY:    CODE STATUS:     HOME MEDICATIONS:    ALLERGIES: No Known Allergies      VITAL SIGNS:  ICU Vital Signs Last 24 Hrs  T(C): 36.4 (31 Oct 2023 17:50), Max: 36.7 (31 Oct 2023 02:07)  T(F): 97.5 (31 Oct 2023 17:50), Max: 98.1 (31 Oct 2023 02:07)  HR: 81 (31 Oct 2023 20:00) (53 - 101)  BP: 154/81 (31 Oct 2023 20:00) (114/68 - 161/76)  BP(mean): 109 (31 Oct 2023 20:00) (83 - 109)  ABP: 158/84 (31 Oct 2023 20:00) (125/65 - 158/84)  ABP(mean): 121 (31 Oct 2023 20:00) (90 - 121)  RR: 17 (31 Oct 2023 20:00) (15 - 18)  SpO2: 96% (31 Oct 2023 20:00) (92% - 100%)    O2 Parameters below as of 31 Oct 2023 20:00  Patient On (Oxygen Delivery Method): nasal cannula  O2 Flow (L/min): 2          NEURO  Exam:  acetaminophen     Tablet .. 975 milliGRAM(s) Oral every 8 hours  acetaminophen   IVPB .. 1000 milliGRAM(s) IV Intermittent once PRN Mild Pain (1 - 3), Moderate Pain (4 - 6), Severe Pain (7 - 10)  ondansetron Injectable 4 milliGRAM(s) IV Push once PRN Nausea and/or Vomiting  ondansetron Injectable 4 milliGRAM(s) IV Push every 6 hours PRN Nausea and/or Vomiting      RESPIRATORY  Mechanical Ventilation:     Exam:  GENERAL: Not in acute distress, non-toxic appearing, sleeping comfortable,   HEAD: normocephalic, atraumatic  HEENT: oral mucosa moist, patent airway  CARDIAC: appears well perfused, Cardiac Rhythm: A fib at rate of 80s  PULM: normal work of breathing on RA  GI: abdomen nondistended  NEURO: Pt sleeping comfortable, no gross neurologic deficit  MSK: good distal pulses in lower extremities bilat  SKIN: dry, well-perfused      10-30 @ 07:01  -  10-31 @ 07:00  --------------------------------------------------------  IN:    dextrose 5%: 60 mL    dextrose 5% + sodium chloride 0.45%: 400 mL  Total IN: 460 mL    OUT:    Indwelling Catheter - Urethral (mL): 1055 mL  Total OUT: 1055 mL    Total NET: -595 mL      10-31 @ 07:01  -  10-31 @ 20:36  --------------------------------------------------------  IN:    dextrose 5% + sodium chloride 0.45%: 120 mL    IV PiggyBack: 250 mL    sodium chloride 0.9%: 225 mL  Total IN: 595 mL    OUT:    Indwelling Catheter - Urethral (mL): 255 mL    Oral Fluid: 0 mL  Total OUT: 255 mL    Total NET: 340 mL        Weight (kg): 63.7 (10-31 @ 15:39)  10-31    144  |  111<H>  |  40<H>  ----------------------------<  100<H>  4.2   |  19<L>  |  1.37<H>    Ca    8.7      31 Oct 2023 18:55      [x ] Sosa catheter, indication: urine output monitoring in critically ill patient                          8.9    7.00  )-----------( 206      ( 31 Oct 2023 18:55 )             28.3     PT/INR - ( 31 Oct 2023 02:24 )   PT: 12.2 sec;   INR: 1.17 ratio         PTT - ( 31 Oct 2023 02:24 )  PTT:29.9 sec  Transfusion: [ ] PRBC	[ ] Platelets	[ ] FFP	[ ] Cryoprecipitate      INFECTIOUS DISEASES  influenza  Vaccine (HIGH DOSE) 0.7 milliLiter(s) IntraMuscular once    RECENT CULTURES:  Specimen Source: Catheterized Catheterized  Date/Time: 10-30 @ 01:10  Culture Results:   50,000 - 99,000 CFU/mL Streptococcus agalactiae (Group B) isolated  Group B streptococci are susceptible to ampicillin,  penicillin and cefazolin, but may be resistant to  erythromycin and clindamycin.<!>  Gram Stain: --  Organism: --      ENDOCRINE    CAPILLARY BLOOD GLUCOSE      POCT Blood Glucose.: 100 mg/dL (31 Oct 2023 11:46)      PATIENT CARE ACCESS DEVICES:  [X ] Peripheral IV  [ ] Central Venous Line	[ ] R	[ ] L	[ ] IJ	[ ] Fem	[ ] SC	Placed:   [X ] Arterial Line		[X ] R	[ ] L	[ ] Fem	[ X] Rad	[ ] Ax	Placed:   [ ] PICC:					[ ] Mediport  [ ] Urinary Catheter, Date Placed:   [x] Necessity of urinary, arterial, and venous catheters discussed    OTHER MEDICATIONS:     IMAGING STUDIES:

## 2023-10-31 NOTE — PRE-ANESTHESIA EVALUATION ADULT - NSANTHADDINFOFT_GEN_ALL_CORE
The patient was seen and evaluated in the holding area prior to entering the operating room; is awake and alert, but extremely frail-appearing and somewhat confused.  Able to converse.  The anesthetic plan, including risks and benefits, was discussed with and obtained from the son Stanton at .  Risks included but not limited to: catheter-associated bleeding/thrombus, hemodynamic instability leading to heart attack/stroke/death.  The possibility of the latter are increased due to the patients extreme age, frailty, and cardiac co-morbidities which are uncorrected. This was discussed with the son who conveyed understanding.  Also discussed was the possibility of endotracheal intubation (primary anesthetic choice is neuraxial) and possibility of prolonged intubation.

## 2023-10-31 NOTE — PROGRESS NOTE ADULT - SUBJECTIVE AND OBJECTIVE BOX
VITAL SIGNS    Telemetry:    afib  86    Vital Signs Last 24 Hrs  T(C): 36.6 (10-31-23 @ 07:01), Max: 36.7 (10-31-23 @ 02:07)  T(F): 97.8 (10-31-23 @ 07:01), Max: 98.1 (10-31-23 @ 02:07)  HR: 86 (10-31-23 @ 07:01) (70 - 86)  BP: 129/77 (10-31-23 @ 07:01) (87/52 - 129/77)  RR: 18 (10-31-23 @ 07:01) (18 - 24)  SpO2: 93% (10-31-23 @ 07:01) (92% - 98%)                   Daily     Daily         CAPILLARY BLOOD GLUCOSE  92 (30 Oct 2023 10:00)      POCT Blood Glucose.: 108 mg/dL (30 Oct 2023 14:31)  POCT Blood Glucose.: 92 mg/dL (30 Oct 2023 10:11)          D                  PHYSICAL EXAM    Neurology: alert and oriented x   CV :  RRR  Lungs:   CTA B/L  Abdomen: soft, nontender, nondistended, positive bowel sounds,   Extremities:       cachetic no edema

## 2023-10-31 NOTE — PROGRESS NOTE ADULT - SUBJECTIVE AND OBJECTIVE BOX
Pt seen and examined at bedside, resting comfortably.     LABS:                        10.1   7.02  )-----------( 205      ( 31 Oct 2023 02:24 )             30.8     10-31    142  |  110<H>  |  43<H>  ----------------------------<  99  4.6   |  20<L>  |  1.54<H>    Ca    9.1      31 Oct 2023 02:24  Phos  4.3     10-29  Mg     2.5     10-29    TPro  6.6  /  Alb  3.4  /  TBili  0.3  /  DBili  x   /  AST  30  /  ALT  24  /  AlkPhos  115  10-29    PT/INR - ( 31 Oct 2023 02:24 )   PT: 12.2 sec;   INR: 1.17 ratio         PTT - ( 31 Oct 2023 02:24 )  PTT:29.9 sec  Urinalysis Basic - ( 31 Oct 2023 02:24 )    Color: x / Appearance: x / SG: x / pH: x  Gluc: 99 mg/dL / Ketone: x  / Bili: x / Urobili: x   Blood: x / Protein: x / Nitrite: x   Leuk Esterase: x / RBC: x / WBC x   Sq Epi: x / Non Sq Epi: x / Bacteria: x        VITAL SIGNS:  T(C): 36.7 (10-31-23 @ 02:07), Max: 36.7 (10-31-23 @ 02:07)  HR: 84 (10-31-23 @ 02:07) (70 - 86)  BP: 114/68 (10-31-23 @ 02:07) (79/44 - 114/68)  RR: 18 (10-31-23 @ 02:07) (18 - 35)  SpO2: 92% (10-31-23 @ 02:07) (87% - 98%)    RLE:  Skin intact, shortened and externally rotated, +edema and +ecchymosis over R hip  +TTP over R hip, no TTP along remainder of extremity; compartments soft  Limited ROM at hip 2/2 pain  +Log roll test  +Pain with axial loading  Motor: TA/EHL/GS/FHL intact  Sensory: DP/SP/Tib/Staci/Saph SILT  +DP pulse, WWP    AP: 89M w/ R Femoral Neck Fx    Plan for OR Today for R Hip Hemiarthroplasty w/ Dr. Martinez.   NPO, IVF while NPO  FU Preop labs/imaging  Pain control prn  DVT PPx: SCDs. Hold chemical DVT Ppx until POD1.   CTSx Management appreciated.   Discussed plan w/ Dr. Martinez who agrees.

## 2023-10-31 NOTE — PROGRESS NOTE ADULT - SUBJECTIVE AND OBJECTIVE BOX
Surgery Progress Note     Subjective:  Patient seen and examined.       Vital Signs:  Vital Signs Last 24 Hrs  T(C): 36.4 (31 Oct 2023 11:26), Max: 36.7 (31 Oct 2023 02:07)  T(F): 97.5 (31 Oct 2023 11:26), Max: 98.1 (31 Oct 2023 02:07)  HR: 85 (31 Oct 2023 11:26) (80 - 101)  BP: 136/67 (31 Oct 2023 11:26) (110/64 - 136/67)  BP(mean): 95 (31 Oct 2023 11:06) (80 - 95)  RR: 18 (31 Oct 2023 11:26) (18 - 18)  SpO2: 98% (31 Oct 2023 11:26) (92% - 98%)    Parameters below as of 31 Oct 2023 11:06  Patient On (Oxygen Delivery Method): nasal cannula        CAPILLARY BLOOD GLUCOSE      POCT Blood Glucose.: 100 mg/dL (31 Oct 2023 11:46)  POCT Blood Glucose.: 108 mg/dL (30 Oct 2023 14:31)      I&O's Detail    30 Oct 2023 07:01  -  31 Oct 2023 07:00  --------------------------------------------------------  IN:    dextrose 5%: 60 mL    dextrose 5% + sodium chloride 0.45%: 400 mL  Total IN: 460 mL    OUT:    Indwelling Catheter - Urethral (mL): 1055 mL  Total OUT: 1055 mL    Total NET: -595 mL      31 Oct 2023 07:01  -  31 Oct 2023 12:17  --------------------------------------------------------  IN:    dextrose 5% + sodium chloride 0.45%: 120 mL    IV PiggyBack: 250 mL  Total IN: 370 mL    OUT:    Indwelling Catheter - Urethral (mL): 80 mL    Oral Fluid: 0 mL  Total OUT: 80 mL    Total NET: 290 mL            Physical Exam:  General: NAD, resting comfortably in bed  HEENT: Normocephalic atraumatic  Respiratory: Nonlabored respirations  Cardio: regular rate  Abdomen: soft, nondistended, nontender  Vascular: extremities are warm and well perfused      Labs:    10-31    142  |  110<H>  |  43<H>  ----------------------------<  99  4.6   |  20<L>  |  1.54<H>    Ca    9.1      31 Oct 2023 02:24  Phos  4.3     10-29  Mg     2.5     10-29    TPro  6.6  /  Alb  3.4  /  TBili  0.3  /  DBili  x   /  AST  30  /  ALT  24  /  AlkPhos  115  10-29    LIVER FUNCTIONS - ( 29 Oct 2023 19:24 )  Alb: 3.4 g/dL / Pro: 6.6 g/dL / ALK PHOS: 115 U/L / ALT: 24 U/L / AST: 30 U/L / GGT: x                                 10.1   7.02  )-----------( 205      ( 31 Oct 2023 02:24 )             30.8     PT/INR - ( 31 Oct 2023 02:24 )   PT: 12.2 sec;   INR: 1.17 ratio         PTT - ( 31 Oct 2023 02:24 )  PTT:29.9 sec

## 2023-10-31 NOTE — PRE PROCEDURE NOTE - ATTENDING COMMENTS
Extensive discussion had with family.  High risk of in hospital mortality discussed.  THey wish to proceed

## 2023-10-31 NOTE — PROGRESS NOTE ADULT - SUBJECTIVE AND OBJECTIVE BOX
ORTHO ATTENDING POST OP    s/p R  hip hemiarthroplasty  WBAT R  LE  Anterior hip precautions  Lovenox  venodynes  ancef x 24h  OOB to chair in AM  rehab consult  CBC in RR and AM   ICU cs in PACU d/t need for pressor support

## 2023-10-31 NOTE — CHART NOTE - NSCHARTNOTEFT_GEN_A_CORE
TERTIARY TRAUMA SURVEY (TTS)    Date of TTS: 10/31/23                             Time: 1330  Admit Date: 10/29/23                             Trauma Activation: No  Admit GCS: 14    HPI:  89M PMHx AF (no AC), HTN, COPD, & CAD, dry skin transferred presented to outside hospital s/p mechanical fall at home. XR Rt femur showed Rt femoral neck fx & CTH showed type II odontoid process fx. Remains in C-collar. Pt was also found to have aortic stenosis & was transferred to [facility] under Dr. Almeida for further workup & management. (29 Oct 2023 18:40)      INTERVAL EVENTS:    PAST MEDICAL & SURGICAL HISTORY:  Unspecified atrial fibrillation      COPD without exacerbation      Hypertension      CAD (coronary artery disease)        [  ] No significant past history as reviewed with the patient and family    FAMILY HISTORY:    [  ] Family history not pertinent as reviewed with the patient and family    Social History:    HOME MEDICATIONS: ***    CURRENT MEDICATIONS:   Medications (inpatient): atorvastatin 40 milliGRAM(s) Oral at bedtime  dextrose 5% + sodium chloride 0.45%. 1000 milliLiter(s) IV Continuous <Continuous>  influenza  Vaccine (HIGH DOSE) 0.7 milliLiter(s) IntraMuscular once  levothyroxine 25 MICROGram(s) Oral daily  mupirocin 2% Nasal 1 Application(s) Both Nostrils two times a day  pantoprazole    Tablet 40 milliGRAM(s) Oral before breakfast  sodium chloride 0.9% lock flush 3 milliLiter(s) IV Push every 8 hours    Medications (PRN):    ALLERGIES:  Allergies: No Known Allergies  (Intolerances: )  ------------------------------------------------------------------------------------------  VITAL SIGNS  Vital Signs Last 24 Hrs  T(C): 36.4 (31 Oct 2023 11:26), Max: 36.7 (31 Oct 2023 02:07)  T(F): 97.5 (31 Oct 2023 11:26), Max: 98.1 (31 Oct 2023 02:07)  HR: 85 (31 Oct 2023 11:26) (80 - 101)  BP: 136/67 (31 Oct 2023 11:26) (110/64 - 136/67)  BP(mean): 95 (31 Oct 2023 11:06) (80 - 95)  RR: 18 (31 Oct 2023 11:26) (18 - 18)  SpO2: 98% (31 Oct 2023 11:26) (92% - 98%)    Parameters below as of 31 Oct 2023 11:06  Patient On (Oxygen Delivery Method): nasal cannula      Drug Dosing Weight    Weight (kg): 63.7 (30 Oct 2023 00:00)      INS/OUTS:    10-30 @ 07:01  -  10-31 @ 07:00  --------------------------------------------------------  IN:    dextrose 5%: 60 mL    dextrose 5% + sodium chloride 0.45%: 400 mL  Total IN: 460 mL    OUT:    Indwelling Catheter - Urethral (mL): 1055 mL  Total OUT: 1055 mL    Total NET: -595 mL      10-31 @ 07:01  -  10-31 @ 13:39  --------------------------------------------------------  IN:    dextrose 5% + sodium chloride 0.45%: 120 mL    IV PiggyBack: 250 mL  Total IN: 370 mL    OUT:    Indwelling Catheter - Urethral (mL): 80 mL    Oral Fluid: 0 mL  Total OUT: 80 mL    Total NET: 290 mL        Drug Dosing Weight    Weight (kg): 63.7 (30 Oct 2023 00:00)    PHYSICAL EXAM:  GEN: resting comfortably in bed, in NAD   HEAD: normocephalic, nontender to palpation   NECK: no JVD, nontender to palpation   CHEST: nontender to palpation across clavicles and b/l anterior ribs   BACK: nontender to palpation along midline and b/l posterior ribs   ABD: soft, nontender, nondistended   EXTREM: b/l UE non-tender to palpation                   b/l LE non-tender to palpation                    Moving all extremities spontaneously; warm, well-perfused, palpable distal pulses   NEURO: A&Ox3, no focal neuro deficits                           10.1   7.02  )-----------( 205      ( 31 Oct 2023 02:24 )             30.8     10-31    142  |  110<H>  |  43<H>  ----------------------------<  99  4.6   |  20<L>  |  1.54<H>    Ca    9.1      31 Oct 2023 02:24  Phos  4.3     10-29  Mg     2.5     10-29    TPro  6.6  /  Alb  3.4  /  TBili  0.3  /  DBili  x   /  AST  30  /  ALT  24  /  AlkPhos  115  10-29    PT/INR - ( 31 Oct 2023 02:24 )   PT: 12.2 sec;   INR: 1.17 ratio         PTT - ( 31 Oct 2023 02:24 )  PTT:29.9 sec  Urinalysis Basic - ( 31 Oct 2023 02:24 )    Color: x / Appearance: x / SG: x / pH: x  Gluc: 99 mg/dL / Ketone: x  / Bili: x / Urobili: x   Blood: x / Protein: x / Nitrite: x   Leuk Esterase: x / RBC: x / WBC x   Sq Epi: x / Non Sq Epi: x / Bacteria: x        List Injuries Identified to Date:    List Operative and Interventional Radiological Procedures:     Consults (Date):  [  ] Neurosurgery   [ x ] Orthopedics  [  ] Plastics  [  ] Urology  [  ] PM&R  [  ] Social Work  CT surgery  Neurosurgery    RADIOLOGICAL FINDINGS REVIEW:  CXR:   < from: Xray Chest 1 View- PORTABLE-Urgent (Xray Chest 1 View- PORTABLE-Urgent .) (10.29.23 @ 20:20) >  Small right pleural effusion.    Pelvis Films:   < from: Xray Hip w/ Pelvis 2 or 3 Views, Right (10.29.23 @ 20:20) >  Acute superolaterally offset impacted right femoral neck subcapital   fracture. No dislocations or additional fractures in remaining imaged   regions.  Intact pelvic and obturator rings and symmetrically aligned and spaced SI   joints and pubic symphysis.  Advanced lower lumbar spine degenerative change apparent. Preserved   bilateral hip joint spaces. Narrowed appearing right medial tibiofemoral   joint space. Intra-articular lateral tibiofemoral chondrocalcinosis.  Generalized osteopenia otherwise no discrete lytic or blastic lesions.  Vascular calcifications.    C-Spine Films:    T/L/S Spine Films:    Extremity Films:    Head CT:  < from: CT Cervical Spine No Cont (10.29.23 @ 23:58) >  1.  No evidence of acute intracranial hemorrhage or midline shift.  2.  Chronic small vessel disease.    C-Spine CT:   < from: CT Cervical Spine No Cont (10.29.23 @ 23:58) >  1.  No evidence of acute osseous fracture or snow dislocation.  2.  Multilevel degenerative change of the cervical spine as discussed   above.  3.  Calcified panus mildly effacing the spinal canal. Consider MRI of the   cervical spine for further evaluation if clinically indicated.  4.  Partially calcified lung nodulein the left lung apex. Recommend   follow-up imaging is per Fleischner criteria.    Neck CT: N/A    Chest CT:  < from: CT Chest No Cont (10.29.23 @ 23:58) >  *  Mild loss of height from the superior endplate of L4, which may   reflect a mild age indeterminate compression fracture.  *  Nondisplaced right posterolateral 11th rib fracture, favored to be   chronic. Recommend correlation for point tenderness. Chronic right   posterolateral 12th rib fracture.  *  Emphysema.  *  Consolidative opacity in the right lower lobe, which may represent   pneumonia, atelectasis, or combination thereof. Recommend follow-up CT   imaging to ensure resolution.  *  Small bilateral pleural effusions.    ABD/Pelvis CT:  < from: CT Chest No Cont (10.29.23 @ 23:58) >  *  Bilateral renal lesions of varying densities, likely cysts of varying   proteinaceous/hemorrhagic content. However, several lesions are   indeterminate or are too small to accurately characterize. Consider   further evaluation with contrast enhanced abdominal MRI as clinically   warranted.  *  Urinary bladder wall thickening, which could be due to underdistention   or chronic bladder outlet obstruction. Cystitis is not excluded.   Recommend correlation with urinalysis.    Other:    INTERPRETATION: 89 yo M w/ PMHx afib (no AC), HTN, COPD, & CAD, dry skin transferred presented to outside hospital s/p mechanical fall at home. TTS unremarkable for additional injuries. Incidental finding includes: partially calcified lung nodule in the left lung apex. Recommend   follow-up imaging is per Fleischner criteria.    PLAN:  - OR today with ortho  - No acute trauma surgical intervention  - Please notify pt/pt family about incidental lung nodule; considering pt's advanced age, defer decision for outpatient workup to pt/family  - Trauma surgery signing off; please re-consult as needed    ACS/Trauma Surgery  [id number] TERTIARY TRAUMA SURVEY (TTS)    Date of TTS: 10/31/23                             Time: 1330  Admit Date: 10/29/23                             Trauma Activation: No  Admit GCS: 14    HPI:  89M PMHx AF (no AC), HTN, COPD, & CAD, dry skin transferred presented to outside hospital s/p mechanical fall at home. XR Rt femur showed Rt femoral neck fx & CTH showed type II odontoid process fx.. Pt was also found to have aortic stenosis & was transferred to [facility] under Dr. Almeida for further workup & management. (29 Oct 2023 18:40)      INTERVAL EVENTS:    PAST MEDICAL & SURGICAL HISTORY:  Unspecified atrial fibrillation      COPD without exacerbation      Hypertension      CAD (coronary artery disease)      CURRENT MEDICATIONS:   Medications (inpatient): atorvastatin 40 milliGRAM(s) Oral at bedtime  dextrose 5% + sodium chloride 0.45%. 1000 milliLiter(s) IV Continuous <Continuous>  influenza  Vaccine (HIGH DOSE) 0.7 milliLiter(s) IntraMuscular once  levothyroxine 25 MICROGram(s) Oral daily  mupirocin 2% Nasal 1 Application(s) Both Nostrils two times a day  pantoprazole    Tablet 40 milliGRAM(s) Oral before breakfast  sodium chloride 0.9% lock flush 3 milliLiter(s) IV Push every 8 hours    Medications (PRN):    ALLERGIES:  Allergies: No Known Allergies  (Intolerances: )  ------------------------------------------------------------------------------------------  VITAL SIGNS  Vital Signs Last 24 Hrs  T(C): 36.4 (31 Oct 2023 11:26), Max: 36.7 (31 Oct 2023 02:07)  T(F): 97.5 (31 Oct 2023 11:26), Max: 98.1 (31 Oct 2023 02:07)  HR: 85 (31 Oct 2023 11:26) (80 - 101)  BP: 136/67 (31 Oct 2023 11:26) (110/64 - 136/67)  BP(mean): 95 (31 Oct 2023 11:06) (80 - 95)  RR: 18 (31 Oct 2023 11:26) (18 - 18)  SpO2: 98% (31 Oct 2023 11:26) (92% - 98%)    Parameters below as of 31 Oct 2023 11:06  Patient On (Oxygen Delivery Method): nasal cannula      Drug Dosing Weight    Weight (kg): 63.7 (30 Oct 2023 00:00)      INS/OUTS:    10-30 @ 07:01  -  10-31 @ 07:00  --------------------------------------------------------  IN:    dextrose 5%: 60 mL    dextrose 5% + sodium chloride 0.45%: 400 mL  Total IN: 460 mL    OUT:    Indwelling Catheter - Urethral (mL): 1055 mL  Total OUT: 1055 mL    Total NET: -595 mL      10-31 @ 07:01  -  10-31 @ 13:39  --------------------------------------------------------  IN:    dextrose 5% + sodium chloride 0.45%: 120 mL    IV PiggyBack: 250 mL  Total IN: 370 mL    OUT:    Indwelling Catheter - Urethral (mL): 80 mL    Oral Fluid: 0 mL  Total OUT: 80 mL    Total NET: 290 mL        Drug Dosing Weight    Weight (kg): 63.7 (30 Oct 2023 00:00)    PHYSICAL EXAM:  GEN: resting comfortably in bed, in NAD   HEENT: normocephalic, nontender to palpation, pupils constricted.   NECK: nontender to palpation. No c-collar present.  CHEST: no significant tenderness to palpation across clavicles and b/l anterior ribs   BACK: no significant tenderness to palpation along midline and b/l posterior ribs  ABD: soft, nontender, nondistended   EXTREM: b/l UE non-tender to palpation                   LLE non-tender to palpation                    RLE mildly tender in setting of R hip arthroplasty.                    Moving all extremities spontaneously; warm, well-perfused, palpable distal pulses   NEURO: A&Ox3, no focal neuro deficits. EOMI. CN2-12 intact.                          10.1   7.02  )-----------( 205      ( 31 Oct 2023 02:24 )             30.8     10-31    142  |  110<H>  |  43<H>  ----------------------------<  99  4.6   |  20<L>  |  1.54<H>    Ca    9.1      31 Oct 2023 02:24  Phos  4.3     10-29  Mg     2.5     10-29    TPro  6.6  /  Alb  3.4  /  TBili  0.3  /  DBili  x   /  AST  30  /  ALT  24  /  AlkPhos  115  10-29    PT/INR - ( 31 Oct 2023 02:24 )   PT: 12.2 sec;   INR: 1.17 ratio         PTT - ( 31 Oct 2023 02:24 )  PTT:29.9 sec  Urinalysis Basic - ( 31 Oct 2023 02:24 )    Color: x / Appearance: x / SG: x / pH: x  Gluc: 99 mg/dL / Ketone: x  / Bili: x / Urobili: x   Blood: x / Protein: x / Nitrite: x   Leuk Esterase: x / RBC: x / WBC x   Sq Epi: x / Non Sq Epi: x / Bacteria: x        List Injuries Identified to Date: Right femoral neck fractire. Odontoid fracture. Acute superolateral right femur fracture. Subcapital fracture. Right posterolateral 12th rib fracture.     List Operative and Interventional Radiological Procedures: Right hip arthroplasty 10/31    Consults (Date):    [ x ] Orthopedics  10/30  CT surgery 10/30  Neurosurgery 10/30    RADIOLOGICAL FINDINGS REVIEW:  CXR:   < from: Xray Chest 1 View- PORTABLE-Urgent (Xray Chest 1 View- PORTABLE-Urgent .) (10.29.23 @ 20:20) >  Small right pleural effusion.    Pelvis Films:   < from: Xray Hip w/ Pelvis 2 or 3 Views, Right (10.29.23 @ 20:20) >  Acute superolaterally offset impacted right femoral neck subcapital   fracture. No dislocations or additional fractures in remaining imaged   regions.  Intact pelvic and obturator rings and symmetrically aligned and spaced SI   joints and pubic symphysis.  Advanced lower lumbar spine degenerative change apparent. Preserved   bilateral hip joint spaces. Narrowed appearing right medial tibiofemoral   joint space. Intra-articular lateral tibiofemoral chondrocalcinosis.  Generalized osteopenia otherwise no discrete lytic or blastic lesions.  Vascular calcifications.    C-Spine Films:    T/L/S Spine Films:    Extremity Films:    Head CT:  < from: CT Cervical Spine No Cont (10.29.23 @ 23:58) >  1.  No evidence of acute intracranial hemorrhage or midline shift.  2.  Chronic small vessel disease.    C-Spine CT:   < from: CT Cervical Spine No Cont (10.29.23 @ 23:58) >  1.  No evidence of acute osseous fracture or snow dislocation.  2.  Multilevel degenerative change of the cervical spine as discussed   above.  3.  Calcified panus mildly effacing the spinal canal. Consider MRI of the   cervical spine for further evaluation if clinically indicated.  4.  Partially calcified lung nodulein the left lung apex. Recommend   follow-up imaging is per Fleischner criteria.    Neck CT: N/A    Chest CT:  < from: CT Chest No Cont (10.29.23 @ 23:58) >  *  Mild loss of height from the superior endplate of L4, which may   reflect a mild age indeterminate compression fracture.  *  Nondisplaced right posterolateral 11th rib fracture, favored to be   chronic. Recommend correlation for point tenderness. Chronic right   posterolateral 12th rib fracture.  *  Emphysema.  *  Consolidative opacity in the right lower lobe, which may represent   pneumonia, atelectasis, or combination thereof. Recommend follow-up CT   imaging to ensure resolution.  *  Small bilateral pleural effusions.    ABD/Pelvis CT:  < from: CT Chest No Cont (10.29.23 @ 23:58) >  *  Bilateral renal lesions of varying densities, likely cysts of varying   proteinaceous/hemorrhagic content. However, several lesions are   indeterminate or are too small to accurately characterize. Consider   further evaluation with contrast enhanced abdominal MRI as clinically   warranted.  *  Urinary bladder wall thickening, which could be due to underdistention   or chronic bladder outlet obstruction. Cystitis is not excluded.   Recommend correlation with urinalysis.    Other:    INTERPRETATION: 89 yo M w/ PMHx afib (no AC), HTN, COPD, & CAD, dry skin transferred presented to outside hospital s/p mechanical fall at home. TTS unremarkable for additional injuries. Incidental finding includes: partially calcified lung nodule in the left lung apex. Recommend   follow-up imaging is per Fleischner criteria.    PLAN:  - No acute trauma surgical intervention  - Please notify pt/pt family about incidental lung nodule; considering pt's advanced age, defer decision for outpatient workup to pt/family  - Trauma surgery signing off; please re-consult as needed    ACS/Trauma Surgery  [id number]

## 2023-10-31 NOTE — CHART NOTE - NSCHARTNOTEFT_GEN_A_CORE
Pt is S/p Orthopedic Surgery w/ Dr. Martinez  No Cardiac surgery intervention  Discussed with Dr. Watkins who also spoke with Dr. Elle Godfrey to transfer off Service to Orthopedic Surgery Dr. Martinez  Please call [phone] with any Questions

## 2023-10-31 NOTE — CONSULT NOTE ADULT - ASSESSMENT
ASSESSMENT:  IRON IBARRA is a 89y Male transferred from outside hospital s/p mechanical fall at home. XR Rt femur showed Rt femoral neck fx & CTH showed type II odontoid process fx. Pt was also found to have aortic stenosis & was transferred to [facility] under Dr. Almeida for further workup & management. No intervention for odontoid process fracture. Pt had Right hip hemiarthroplasty and required 0.5 phenylephrine intraop but off of it post op. Pt was not intubated and received spinal anesthesia.     Currently Pt is well appearing with stable vitals off of pressors on RA.      Neuro:  - Assess neurological status every 1hour for  - Multimodal pain control per primary team    Resp:  - Out of bed to chair, ambulate as tolerated, and incentive spirometry to prevent atelectasis  - normal work of breathing on RA    CVS:   - AFib not on any AC at home  - off pressors    GI:   - Bowel regimen with senna & Miralax  - Protonix for stress ulcer prophylaxis while npo  - Advance diet as tolerated    Renal:  - Monitor I&Os and electrolytes  - replete electrolytes as needed     Heme:  - Monitor CBC and coags  -  VTE prophylaxis: Lovenox  -SCD's    ID: sepsis:  - Monitor for clinical evidence of active infection  - Monitor WBC, temperature, and procalcitonin  - Antibiotics Cefazolin per primary team    Endo: hx of DM, Hypothyroidism  - Monitor glucose of bmp    Lines/Tubes:  - R radial A line  - PIVs x2    Dispo: monitor on PACU and if Pt does not require pressors, Pt can go to floor       ASSESSMENT:  IRON IBARRA is a 89y Male transferred from outside hospital s/p mechanical fall at home. XR Rt femur showed Rt femoral neck fx & CTH showed type II odontoid process fx. Pt was also found to have aortic stenosis & was transferred to [facility] under Dr. Almeida for further workup & management. No intervention for odontoid process fracture. Pt had Right hip hemiarthroplasty and required 0.5 phenylephrine intraop but off of it post op. Pt was not intubated and received spinal anesthesia.     Currently Pt is well appearing with stable vitals off of pressors on RA.      Neuro:  - Assess neurological status every 1hour for  - Multimodal pain control per primary team    Resp:  - Out of bed to chair, ambulate as tolerated, and incentive spirometry to prevent atelectasis  - normal work of breathing on RA    CVS:   - AFib not on any AC at home  - off pressors    GI:   - Bowel regimen with senna & Miralax  - Protonix for stress ulcer prophylaxis while npo  - Advance diet as tolerated    Renal:  - Monitor I&Os and electrolytes  - replete electrolytes as needed     Heme:  - Monitor CBC and coags  -  VTE prophylaxis: Lovenox  -SCD's    ID: sepsis:  - Monitor for clinical evidence of active infection  - Monitor WBC, temperature, and procalcitonin  - Antibiotics Cefazolin per primary team    Endo: hx of DM, Hypothyroidism  - Monitor glucose of bmp    Lines/Tubes:  - R radial A line  - PIVs x2    Dispo: Pt was assessed at bedside with Dr. Toney. Pt is appropriate to go to floor.

## 2023-11-01 ENCOUNTER — TRANSCRIPTION ENCOUNTER (OUTPATIENT)
Age: 88
End: 2023-11-01

## 2023-11-01 DIAGNOSIS — R52 PAIN, UNSPECIFIED: ICD-10-CM

## 2023-11-01 LAB
ANION GAP SERPL CALC-SCNC: 16 MMOL/L — SIGNIFICANT CHANGE UP (ref 5–17)
ANION GAP SERPL CALC-SCNC: 16 MMOL/L — SIGNIFICANT CHANGE UP (ref 5–17)
BUN SERPL-MCNC: 40 MG/DL — HIGH (ref 7–23)
BUN SERPL-MCNC: 40 MG/DL — HIGH (ref 7–23)
CALCIUM SERPL-MCNC: 8.9 MG/DL — SIGNIFICANT CHANGE UP (ref 8.4–10.5)
CALCIUM SERPL-MCNC: 8.9 MG/DL — SIGNIFICANT CHANGE UP (ref 8.4–10.5)
CHLORIDE SERPL-SCNC: 111 MMOL/L — HIGH (ref 96–108)
CHLORIDE SERPL-SCNC: 111 MMOL/L — HIGH (ref 96–108)
CO2 SERPL-SCNC: 19 MMOL/L — LOW (ref 22–31)
CO2 SERPL-SCNC: 19 MMOL/L — LOW (ref 22–31)
CREAT SERPL-MCNC: 1.36 MG/DL — HIGH (ref 0.5–1.3)
CREAT SERPL-MCNC: 1.36 MG/DL — HIGH (ref 0.5–1.3)
EGFR: 46 ML/MIN/1.73M2 — LOW
EGFR: 46 ML/MIN/1.73M2 — LOW
GLUCOSE SERPL-MCNC: 80 MG/DL — SIGNIFICANT CHANGE UP (ref 70–99)
GLUCOSE SERPL-MCNC: 80 MG/DL — SIGNIFICANT CHANGE UP (ref 70–99)
HCT VFR BLD CALC: 30.1 % — LOW (ref 39–50)
HCT VFR BLD CALC: 30.1 % — LOW (ref 39–50)
HGB BLD-MCNC: 9.4 G/DL — LOW (ref 13–17)
HGB BLD-MCNC: 9.4 G/DL — LOW (ref 13–17)
MCHC RBC-ENTMCNC: 28.3 PG — SIGNIFICANT CHANGE UP (ref 27–34)
MCHC RBC-ENTMCNC: 28.3 PG — SIGNIFICANT CHANGE UP (ref 27–34)
MCHC RBC-ENTMCNC: 31.2 GM/DL — LOW (ref 32–36)
MCHC RBC-ENTMCNC: 31.2 GM/DL — LOW (ref 32–36)
MCV RBC AUTO: 90.7 FL — SIGNIFICANT CHANGE UP (ref 80–100)
MCV RBC AUTO: 90.7 FL — SIGNIFICANT CHANGE UP (ref 80–100)
NRBC # BLD: 0 /100 WBCS — SIGNIFICANT CHANGE UP (ref 0–0)
NRBC # BLD: 0 /100 WBCS — SIGNIFICANT CHANGE UP (ref 0–0)
PLATELET # BLD AUTO: 212 K/UL — SIGNIFICANT CHANGE UP (ref 150–400)
PLATELET # BLD AUTO: 212 K/UL — SIGNIFICANT CHANGE UP (ref 150–400)
POTASSIUM SERPL-MCNC: 4.3 MMOL/L — SIGNIFICANT CHANGE UP (ref 3.5–5.3)
POTASSIUM SERPL-MCNC: 4.3 MMOL/L — SIGNIFICANT CHANGE UP (ref 3.5–5.3)
POTASSIUM SERPL-SCNC: 4.3 MMOL/L — SIGNIFICANT CHANGE UP (ref 3.5–5.3)
POTASSIUM SERPL-SCNC: 4.3 MMOL/L — SIGNIFICANT CHANGE UP (ref 3.5–5.3)
RBC # BLD: 3.32 M/UL — LOW (ref 4.2–5.8)
RBC # BLD: 3.32 M/UL — LOW (ref 4.2–5.8)
RBC # FLD: 18 % — HIGH (ref 10.3–14.5)
RBC # FLD: 18 % — HIGH (ref 10.3–14.5)
SODIUM SERPL-SCNC: 146 MMOL/L — HIGH (ref 135–145)
SODIUM SERPL-SCNC: 146 MMOL/L — HIGH (ref 135–145)
WBC # BLD: 7.17 K/UL — SIGNIFICANT CHANGE UP (ref 3.8–10.5)
WBC # BLD: 7.17 K/UL — SIGNIFICANT CHANGE UP (ref 3.8–10.5)
WBC # FLD AUTO: 7.17 K/UL — SIGNIFICANT CHANGE UP (ref 3.8–10.5)
WBC # FLD AUTO: 7.17 K/UL — SIGNIFICANT CHANGE UP (ref 3.8–10.5)

## 2023-11-01 RX ORDER — AMLODIPINE BESYLATE 2.5 MG/1
1 TABLET ORAL
Refills: 0 | DISCHARGE

## 2023-11-01 RX ORDER — FUROSEMIDE 40 MG
1 TABLET ORAL
Refills: 0 | DISCHARGE

## 2023-11-01 RX ORDER — AMLODIPINE BESYLATE 2.5 MG/1
2.5 TABLET ORAL DAILY
Refills: 0 | Status: DISCONTINUED | OUTPATIENT
Start: 2023-11-01 | End: 2023-11-07

## 2023-11-01 RX ORDER — FUROSEMIDE 40 MG
20 TABLET ORAL DAILY
Refills: 0 | Status: DISCONTINUED | OUTPATIENT
Start: 2023-11-01 | End: 2023-11-07

## 2023-11-01 RX ORDER — ACETAMINOPHEN 500 MG
1000 TABLET ORAL ONCE
Refills: 0 | Status: COMPLETED | OUTPATIENT
Start: 2023-11-01 | End: 2023-11-01

## 2023-11-01 RX ADMIN — SODIUM CHLORIDE 75 MILLILITER(S): 9 INJECTION INTRAMUSCULAR; INTRAVENOUS; SUBCUTANEOUS at 08:53

## 2023-11-01 RX ADMIN — Medication 81 MILLIGRAM(S): at 12:57

## 2023-11-01 RX ADMIN — Medication 100 MILLIGRAM(S): at 01:00

## 2023-11-01 RX ADMIN — ENOXAPARIN SODIUM 40 MILLIGRAM(S): 100 INJECTION SUBCUTANEOUS at 12:57

## 2023-11-01 RX ADMIN — Medication 1000 MILLIGRAM(S): at 05:47

## 2023-11-01 RX ADMIN — POLYETHYLENE GLYCOL 3350 17 GRAM(S): 17 POWDER, FOR SOLUTION ORAL at 12:57

## 2023-11-01 RX ADMIN — Medication 100 MILLIGRAM(S): at 08:53

## 2023-11-01 RX ADMIN — Medication 400 MILLIGRAM(S): at 05:17

## 2023-11-01 RX ADMIN — LIDOCAINE 1 PATCH: 4 CREAM TOPICAL at 12:57

## 2023-11-01 RX ADMIN — Medication 400 MILLIGRAM(S): at 16:39

## 2023-11-01 NOTE — PROGRESS NOTE ADULT - SUBJECTIVE AND OBJECTIVE BOX
Patient 89 Y male evaluated measured fitted  for rigid Aspen cervical collar  to aid in treatment of  post odontoid fracture for OBB use ordered by Orthopedics  delivered by [facility] [phone]

## 2023-11-01 NOTE — CONSULT NOTE ADULT - PROBLEM SELECTOR PROBLEM 2
Type II fracture of odontoid process Purse String (Simple) Text: Given the location of the defect and the characteristics of the surrounding skin a purse string closure was deemed most appropriate.  Undermining was performed circumfirentially around the surgical defect.  A purse string suture was then placed and tightened.

## 2023-11-01 NOTE — OCCUPATIONAL THERAPY INITIAL EVALUATION ADULT - LIVES WITH, PROFILE
Per pt daughter , pt lives with son in a PH + 3 SILVIA with tub shower + 1 flight of steps to bed room. Pt was independent with ambulation at baseline with and with cane./children Per pt daughter , pt lives with son in a PH + 3 SILVIA with tub shower + 1 flight of steps to bed room. Pt was independent with ambulation at baseline with and without cane./children

## 2023-11-01 NOTE — PHYSICAL THERAPY INITIAL EVALUATION ADULT - GENERAL OBSERVATIONS, REHAB EVAL
Pt presents s/p fall, now s/p Right hip hemiarthroplasty anterior approach on 10/31, WBAT RLE, also noted on CT CS type 2 odontoid fx, per neurosugery no intervention, C-collar to be worn when OOB. Pt received semi-supine in bed in NAD, VSS, +IVL, +3L O2 NC, +pulse ox, +c-collar donned, pt A&Ox1-2, following 75% simple commands with cueing for safety, daughter at bedside.

## 2023-11-01 NOTE — PROGRESS NOTE ADULT - SUBJECTIVE AND OBJECTIVE BOX
Ortho Progress Note    S: Patient seen and examined. No acute events overnight. Pain well controlled with current regimen. Denies lightheadedness/dizziness, CP/SOB. Tolerating diet.       O:  Physical Exam:  Gen: Laying in bed, NAD, alert and oriented.   Resp: Unlabored breathing  Ext: Dressing CDI           EHL/FHL/TA/Sol intact          + SILT DP/SP/SALVADOR/Sa/Tib          +DP, extremity WWP    Vital Signs Last 24 Hrs  T(C): 36.5 (01 Nov 2023 06:00), Max: 36.6 (31 Oct 2023 07:01)  T(F): 97.7 (01 Nov 2023 06:00), Max: 97.9 (01 Nov 2023 04:00)  HR: 78 (01 Nov 2023 06:00) (53 - 101)  BP: 153/96 (01 Nov 2023 06:00) (117/69 - 169/93)  BP(mean): 90 (31 Oct 2023 21:30) (83 - 109)  RR: 18 (01 Nov 2023 06:00) (12 - 18)  SpO2: 100% (01 Nov 2023 06:00) (93% - 100%)    Parameters below as of 01 Nov 2023 06:00  Patient On (Oxygen Delivery Method): nasal cannula  O2 Flow (L/min): 2                            8.9    7.00  )-----------( 206      ( 31 Oct 2023 18:55 )             28.3                         10.1   7.02  )-----------( 205      ( 31 Oct 2023 02:24 )             30.8       10-31    144  |  111<H>  |  40<H>  ----------------------------<  100<H>  4.2   |  19<L>  |  1.37<H>        PT/INR - ( 31 Oct 2023 02:24 )   PT: 12.2 sec;   INR: 1.17 ratio         PTT - ( 31 Oct 2023 02:24 )  PTT:29.9 sec        A/P: 89M POD1 s/p R hip janeen, doing well  - Pain control  - DVT ppx ASA 81, Lovenox 40  - Ancef 24h  - WBAT  - PT/OT  - Dispo planning

## 2023-11-01 NOTE — PROGRESS NOTE ADULT - SUBJECTIVE AND OBJECTIVE BOX
Surgery Progress Note     Subjective:  Patient seen and examined on AM rounds. Patient is sleepy but arousable. Pain is controlled.      Vital Signs:  Vital Signs Last 24 Hrs  T(C): 36.5 (01 Nov 2023 06:00), Max: 36.6 (31 Oct 2023 07:01)  T(F): 97.7 (01 Nov 2023 06:00), Max: 97.9 (01 Nov 2023 04:00)  HR: 78 (01 Nov 2023 06:00) (53 - 101)  BP: 153/96 (01 Nov 2023 06:00) (117/69 - 169/93)  BP(mean): 90 (31 Oct 2023 21:30) (83 - 109)  RR: 18 (01 Nov 2023 06:00) (12 - 18)  SpO2: 100% (01 Nov 2023 06:00) (93% - 100%)    Parameters below as of 01 Nov 2023 06:00  Patient On (Oxygen Delivery Method): nasal cannula  O2 Flow (L/min): 2      CAPILLARY BLOOD GLUCOSE      POCT Blood Glucose.: 100 mg/dL (31 Oct 2023 11:46)      I&O's Detail    30 Oct 2023 07:01  -  31 Oct 2023 07:00  --------------------------------------------------------  IN:    dextrose 5%: 60 mL    dextrose 5% + sodium chloride 0.45%: 400 mL  Total IN: 460 mL    OUT:    Indwelling Catheter - Urethral (mL): 1055 mL  Total OUT: 1055 mL    Total NET: -595 mL      31 Oct 2023 07:01  -  01 Nov 2023 06:47  --------------------------------------------------------  IN:    dextrose 5% + sodium chloride 0.45%: 120 mL    IV PiggyBack: 350 mL    Oral Fluid: 300 mL    sodium chloride 0.9%: 525 mL  Total IN: 1295 mL    OUT:    Indwelling Catheter - Urethral (mL): 575 mL  Total OUT: 575 mL    Total NET: 720 mL            Physical Exam:  General: NAD, resting comfortably in bed  HEENT: Normocephalic atraumatic  Respiratory: Nonlabored respirations, on NC 2L  Cardio: regular rate  Abdomen: soft, nondistended, nontender  Vascular: extremities are warm and well perfused - RLE dressing intact      Labs:    10-31    144  |  111<H>  |  40<H>  ----------------------------<  100<H>  4.2   |  19<L>  |  1.37<H>    Ca    8.7      31 Oct 2023 18:55                              8.9    7.00  )-----------( 206      ( 31 Oct 2023 18:55 )             28.3     PT/INR - ( 31 Oct 2023 02:24 )   PT: 12.2 sec;   INR: 1.17 ratio         PTT - ( 31 Oct 2023 02:24 )  PTT:29.9 sec

## 2023-11-01 NOTE — DISCHARGE NOTE PROVIDER - CARE PROVIDER_API CALL
Kumar Martinez  Orthopaedic Surgery  [address], [address]  [place] [address]  Phone: [phone]  Fax: [phone]  Follow Up Time:    Kumar Martinez  Orthopaedic Surgery  [address], [address]  [place] [address]  Phone: [phone]  Fax: [phone]  Follow Up Time: 2 weeks

## 2023-11-01 NOTE — OCCUPATIONAL THERAPY INITIAL EVALUATION ADULT - STRENGTHENING, PT EVAL
GOAL: Pt will increase UE/LE  strength 1 grade for increased safety and independence with ADL task in 4 weeks.

## 2023-11-01 NOTE — PHYSICAL THERAPY INITIAL EVALUATION ADULT - ADDITIONAL COMMENTS
PTA pt's daughter states pt ambulated independently with or without a cane, performed ADLs independently. has a tub shower. lives with his son, has a daughter who lives in [place]

## 2023-11-01 NOTE — PHYSICAL THERAPY INITIAL EVALUATION ADULT - PERTINENT HX OF CURRENT PROBLEM, REHAB EVAL
89M PMHx AF (no AC), HTN, COPD, & CAD, dry skin transferred presented to outside hospital s/p mechanical fall at home. XR Rt femur showed Rt femoral neck fx & CTH showed type II odontoid process fx. Remains in C-collar. Pt was also found to have aortic stenosis & was transferred to [facility]  further workup & management. Patient was found to have a right hip fracture as well as a type 2 dens fracture. Patient is s/p right hemiarthroplasty. Patient tolerated the procedure well. Patient seen now resting comfortably. No acute trauma surgical intervention. No neurosurgical intervention. C collar when OOB.

## 2023-11-01 NOTE — PHYSICAL THERAPY INITIAL EVALUATION ADULT - RANGE OF MOTION EXAMINATION, REHAB EVAL
RLE hip flex limited due to pain/bilateral upper extremity ROM was WFL (within functional limits)/bilateral lower extremity ROM was WFL (within functional limits)

## 2023-11-01 NOTE — DISCHARGE NOTE PROVIDER - NSDCCPTREATMENT_GEN_ALL_CORE_FT
PRINCIPAL PROCEDURE  Procedure: Hemiarthroplasty, hip  Findings and Treatment:       SECONDARY PROCEDURE  Procedure: Hemiarthroplasty, hip  Findings and Treatment: anterior     PRINCIPAL PROCEDURE  Procedure: Hemiarthroplasty, hip  Findings and Treatment: anterior/lateral approach

## 2023-11-01 NOTE — OCCUPATIONAL THERAPY INITIAL EVALUATION ADULT - IMPAIRED TRANSFERS: BED/CHAIR, REHAB EVAL
impaired balance/pain/decreased ROM/decreased strength impaired balance/cognition/impaired coordination/pain/decreased ROM/decreased strength

## 2023-11-01 NOTE — CONSULT NOTE ADULT - ASSESSMENT
89 yo male presents after a fall at home. Patient found to have a right hip fracture as well as a type 2 dens fracture. Patient s/p a right hemiarthroplasty.

## 2023-11-01 NOTE — DISCHARGE NOTE PROVIDER - NSDCFUADDINST_GEN_ALL_CORE_FT
Continue weight bearing as tolerated ambulation with rolling walker and maintaining anterior total hip precautions. Keep surgical incision/dressing clean and dry. Have ressing/sutures/staples removed and steri-strips applied post operative day #14 (11/13/2023)if applicable. Follow up with Dr. Martinez in his office 3-4 weeks post op. Continue weight bearing as tolerated ambulation with rolling walker and ASPEN collar and maintaining anterior total hip precautions.   Keep surgical incision/dressing clean and dry. Have dressing/sutures/staples removed and steri-strips applied post operative day #14 (11/13/2023)if applicable.   Follow up with Dr. Martinez in his office 2-3 weeks post op.  Neurosurgery followup recommended in 2 weeks for nonsurgical management of Odontoid fx  Please follow up with your primary care physician as well as cardiology regarding your hospitalization within the month of your discharge.  Continue weight bearing as tolerated ambulation with rolling walker and ASPEN collar and maintaining anterior total hip precautions.   Keep Right Hip surgical incision/dressing clean and dry. Have dressing/sutures/staples removed and steri-strips applied post operative day #14 (11/13/2023)if applicable.   Follow up with Dr. Martinez in his office 2-3 weeks post op.  Neurosurgery followup recommended in 2 weeks for nonsurgical management of Odontoid fx.  Right elbow wound care Recommendations:  1) continue turning and positioning q2 and PRN utilizing offloading assistive devices  2) continue with routine pericare daily and PRN soiling  3) encourage optimal nutrition  4) waffle cushion when oob to chair  5) B/L LE complete cair air fluidized boots OR nanci lock pillow to offload heels/feet  6) triad protective barrier cream to B/L buttocks/sacrum daily and PRN soiling  7) right elbow - cleanse skin and pat dry then apply cavilon to periwound and lay adaptic on base of wound then paste medihoney gel onto adaptic over the wound bed. cover with allevyn foam, change daily and/or PRN soiling.  Follow up with Dr. Martinez in his office 2-3 weeks post op.  Neurosurgery followup recommended in 2 weeks for nonsurgical management of Odontoid fx  Please follow up with your primary care physician as well as cardiology regarding your hospitalization within the month of your discharge   to discuss recent surgery/general checkup.

## 2023-11-01 NOTE — ADVANCED PRACTICE NURSE CONSULT - ASSESSMENT
Patient encountered on 7 tower. When wound care RN arrived on unit, patient was found lying in a low air loss pressure redistribution support surface style bed talking to hospital volunteer. Patient is 89 and a little forgetful but was alert and oriented and gave consent to skin consult. On right elbow there is a wound measuring approximately 9cm x 2.5cm x unknown with adherent layer of slough covering 100% of wound base. Patient reports that he had fallen before coming to the hospital but does not recall what day he fell. Area cleansed and dressing applied with RN at bedside for recommendations.

## 2023-11-01 NOTE — CONSULT NOTE ADULT - PROBLEM SELECTOR RECOMMENDATION 9
Patient tolerated the procedure well  Physical therapy as tolerated  PO as tolerated   DVT and GI prophylaxis

## 2023-11-01 NOTE — CONSULT NOTE ADULT - SUBJECTIVE AND OBJECTIVE BOX
89M PMHx AF (no AC), HTN, COPD, & CAD, dry skin transferred presented to outside hospital s/p mechanical fall at home. XR Rt femur showed Rt femoral neck fx & CTH showed type II odontoid process fx. Remains in C-collar. Pt was also found to have aortic stenosis & was transferred to [facility]  further workup & management. Patient was found to have a right hip fracture as well as a type 2 dens fracture. Patient is s/p a hemiarthroplasty. Patient tolerated the procedure well. Patient seen now resting comfortably.        PAST MEDICAL & SURGICAL HISTORY:  Unspecified atrial fibrillation      COPD without exacerbation      Hypertension      CAD (coronary artery disease)            MEDICATIONS  (STANDING):  acetaminophen   IVPB .. 1000 milliGRAM(s) IV Intermittent once  amLODIPine   Tablet 2.5 milliGRAM(s) Oral daily  aspirin enteric coated 81 milliGRAM(s) Oral daily  enoxaparin Injectable 40 milliGRAM(s) SubCutaneous every 24 hours  furosemide    Tablet 20 milliGRAM(s) Oral daily  influenza  Vaccine (HIGH DOSE) 0.7 milliLiter(s) IntraMuscular once  lidocaine   4% Patch 1 Patch Transdermal daily  polyethylene glycol 3350 17 Gram(s) Oral daily  senna 2 Tablet(s) Oral at bedtime  sodium chloride 0.9%. 1000 milliLiter(s) (75 mL/Hr) IV Continuous <Continuous>    MEDICATIONS  (PRN):  ondansetron Injectable 4 milliGRAM(s) IV Push every 6 hours PRN Nausea and/or Vomiting    Social Hx:  Tobacco: former smoker quit > 40 years ago  ETOH: Neg  Drugs:  Neg    Family Hx:  As per my conversation with the patient, non contributory     ROS  CONSTITUTIONAL: No weakness, fevers or chills  EYES/ENT: No visual changes;  No vertigo or throat pain   NECK: No pain or stiffness  RESPIRATORY: No cough, wheezing, hemoptysis; No shortness of breath  CARDIOVASCULAR: No chest pain or palpitations  GASTROINTESTINAL: No abdominal or epigastric pain. No nausea, vomiting, or hematemesis; No diarrhea or constipation. No melena or hematochezia.  GENITOURINARY: No dysuria, frequency or hematuria  NEUROLOGICAL: No numbness or weakness  SKIN: No itching, burning, rashes, or lesions   MUSCULOSKELETAL: right leg pain    INTERVAL HPI/OVERNIGHT EVENTS:  T(C): 36.3 (11-01-23 @ 14:17), Max: 36.6 (11-01-23 @ 04:00)  HR: 92 (11-01-23 @ 14:17) (53 - 92)  BP: 95/56 (11-01-23 @ 14:17) (95/56 - 169/93)  RR: 18 (11-01-23 @ 14:17) (12 - 18)  SpO2: 98% (11-01-23 @ 14:17) (94% - 100%)  Wt(kg): --  I&O's Summary    31 Oct 2023 07:01  -  01 Nov 2023 07:00  --------------------------------------------------------  IN: 1515 mL / OUT: 575 mL / NET: 940 mL    01 Nov 2023 07:01  -  01 Nov 2023 15:14  --------------------------------------------------------  IN: 480 mL / OUT: 650 mL / NET: -170 mL        PHYSICAL EXAM:  GENERAL: NAD, well-groomed, well-developed  HEAD:  Atraumatic, Normocephalic  EYES: EOMI, PERRLA, conjunctiva and sclera clear  ENMT: No tonsillar erythema, exudates, or enlargement; Moist mucous membranes, Good dentition, No lesions  NECK: Supple, No JVD, Normal thyroid  NERVOUS SYSTEM:  Alert & Oriented X3, Good concentration; Motor Strength 5/5 B/L upper and lower extremities; DTRs 2+ intact and symmetric  CHEST/LUNG: Clear to percussion bilaterally; No rales, rhonchi, wheezing, or rubs  HEART: Regular rate and rhythm; No murmurs, rubs, or gallops  ABDOMEN: Soft, Nontender, Nondistended; Bowel sounds present  EXTREMITIES:  2+ Peripheral Pulses, No clubbing, cyanosis, or edema  LYMPH: No lymphadenopathy noted  SKIN: No rashes or lesions        LABS:                        9.4    7.17  )-----------( 212      ( 01 Nov 2023 07:27 )             30.1     11-01    146<H>  |  111<H>  |  40<H>  ----------------------------<  80  4.3   |  19<L>  |  1.36<H>    Ca    8.9      01 Nov 2023 07:27      PT/INR - ( 31 Oct 2023 02:24 )   PT: 12.2 sec;   INR: 1.17 ratio         PTT - ( 31 Oct 2023 02:24 )  PTT:29.9 sec  Urinalysis Basic - ( 01 Nov 2023 07:27 )    Color: x / Appearance: x / SG: x / pH: x  Gluc: 80 mg/dL / Ketone: x  / Bili: x / Urobili: x   Blood: x / Protein: x / Nitrite: x   Leuk Esterase: x / RBC: x / WBC x   Sq Epi: x / Non Sq Epi: x / Bacteria: x      CAPILLARY BLOOD GLUCOSE            Urinalysis Basic - ( 01 Nov 2023 07:27 )    Color: x / Appearance: x / SG: x / pH: x  Gluc: 80 mg/dL / Ketone: x  / Bili: x / Urobili: x   Blood: x / Protein: x / Nitrite: x   Leuk Esterase: x / RBC: x / WBC x   Sq Epi: x / Non Sq Epi: x / Bacteria: x        Radiology reports:

## 2023-11-01 NOTE — OCCUPATIONAL THERAPY INITIAL EVALUATION ADULT - PLANNED THERAPY INTERVENTIONS, OT EVAL
ADL retraining/bed mobility training/strengthening/transfer training ADL retraining/bed mobility training/cognitive, visual perceptual/strengthening/transfer training

## 2023-11-01 NOTE — DISCHARGE NOTE PROVIDER - HOSPITAL COURSE
History of Present Illness:   89M PMHx AF (no AC), HTN, COPD, & CAD, dry skin transferred presented to outside hospital s/p mechanical fall at home. XR Rt femur showed Rt femoral neck fx & CTH showed type II odontoid process fx. Remains in C-collar. Pt was also found to have aortic stenosis & was transferred to [facility] under Dr. Almeida for further workup & management.    PAST MEDICAL, SURGICAL HISTORY:  Afib  HTN  CAD  COPD  CRI    HOSPITAL COURSE:  89 y/o M underwent right hip hemiarthroplasty on 10/31/2023 with Dr. Martinez.  Patient tolerated procedure well.  Patient was evaluated postoperatively by physical and occupational therapists for weight bearing as tolerated ambulation  and advised that patient would benefit from admission to rehab facility.  Patient advised to keep surgical incision/dressing clean and dry, and have dressing/sutures/surgical staples removed and steri strips applied post op day #14 (11/13/2023)if applicable.  Patient further advised to follow up with Dr. Martinez in his office 3-4 weeks post op.     History of Present Illness:   89M PMHx AF (no AC), HTN, COPD, & CAD, dry skin transferred presented to outside hospital s/p mechanical fall at home. XR Rt femur showed Rt femoral neck fx & CTH showed type II odontoid process fx. Remains in C-collar. Pt was also found to have aortic stenosis & was transferred to [facility]  further workup & management. Patient was found to have a right hip fracture as well as a type 2 dens fracture.     PAST MEDICAL & SURGICAL HISTORY:  Unspecified atrial fibrillation  COPD without exacerbation  Hypertension  CAD (coronary artery disease)      HOSPITAL COURSE:  89 y/o M admitted to [facility] CTU as a transfer from Outside hospital on 10/29/23 following syncopal fall at home, for surgical intervention of a right femoral neck fx, as well as a Type II odontoid fx. Trauma team consulted, as well as neurosurgery consulted for management of injuries.   Neurosurgery recommended nonsurgical management of the odontoid fx, with C-collar when OOB. (Rigid aspen cervical collar ordered and fitted to patient at bedside). Patient recommended outpatient fu with Dr. Burt.   Structural heart team consulted due to history of severe AS, recommendations followed. Determined no indication for TAVR, optimized for surgical intervention on right hip fx.   Following medical and cardiology and trauma optimization and clearance, patient underwent right hip hemiarthroplasty on 10/31/2023 with Dr. Martinez.  Patient tolerated procedure well, required pressors intraop but off post op. SICU consulted and cleared to dispo to the floor.   Patient placed on ANTERIOR hip precautions. Made WBAT. DVT ppx: Lovenox QD/ASA 81mg QD  Wound consult placed for evaluation of right elbow wound/Skin tear. Recs followed.   Internal medicine consulted on 11/1/23 for co-management of comorbidities. Recommendations followed.   Remainder of hospital course: XXXXXX  Patient was evaluated postoperatively by physical and occupational therapists for weight bearing as tolerated ambulation  and advised that patient would benefit from admission to rehab facility.  Patient advised to keep surgical incision/dressing clean and dry, and have dressing/sutures/surgical staples removed and steri strips applied post op day #14 (11/13/2023)if applicable.  Patient further advised to follow up with Dr. Martinez in his office 3-4 weeks post op.     History of Present Illness:   89M PMHx AF (no AC), HTN, COPD, & CAD, dry skin transferred presented to outside hospital s/p mechanical fall at home. XR Rt femur showed Rt femoral neck fx & CTH showed type II odontoid process fx. Remains in C-collar. Pt was also found to have aortic stenosis & was transferred to [facility] further workup & management. Patient was found to have a right hip fracture as well as a type 2 dens fracture.     PAST MEDICAL & SURGICAL HISTORY:  Unspecified atrial fibrillation  COPD without exacerbation  Hypertension  CAD (coronary artery disease)      HOSPITAL COURSE:  89 y/o M admitted to [facility] CTU as a transfer from Outside hospital on 10/29/23 following syncopal fall at home, for surgical intervention of a right femoral neck fx, as well as a Type II odontoid fx. Trauma team consulted, as well as neurosurgery consulted for management of injuries.   Neurosurgery recommended nonsurgical management of the odontoid fx, with C-collar when OOB. (Rigid aspen cervical collar ordered and fitted to patient at bedside). Patient recommended outpatient fu with Dr. Burt.   Structural heart team consulted due to history of severe AS, recommendations followed. Determined no indication for TAVR, optimized for surgical intervention on right hip fx.   Following medical and cardiology and trauma optimization and clearance, patient underwent right hip hemiarthroplasty on 10/31/2023 with Dr. Martinez.  Patient tolerated procedure well, required pressors intraop but off post op. SICU consulted and cleared to disposition to the orthopedic floor.   Patient placed on ANTERIOR hip precautions. Made WBAT on right leg. DVT ppx: Lovenox QD/ASA 81mg QD started X 6 weeks postop.  Wound consult placed for evaluation of right elbow wound/Skin tear.   Right elbow wound care Recommendations:  1) continue turning and positioning q2 and PRN utilizing offloading assistive devices  2) continue with routine pericare daily and PRN soiling  3) encourage optimal nutrition  4) waffle cushion when oob to chair  5) B/L LE complete cair air fluidized boots OR nanci lock pillow to offload heels/feet  6) triad protective barrier cream to B/L buttocks/sacrum daily and PRN soiling  7) right elbow - cleanse skin and pat dry then apply cavilon to periwound and lay adaptic on base of wound then paste medihoney gel onto adaptic over the wound bed. cover with allevyn foam, change daily and/or PRN soiling               Internal medicine consulted on 11/1/23 for co-management of comorbidities. Recommendations followed.     Patient was evaluated postoperatively by physical and occupational therapists for weight bearing as tolerated ambulation  and advised that patient would benefit from admission to rehab facility.  Patient advised to keep surgical incision/dressing clean and dry, and have dressing/sutures/surgical staples removed and steri strips applied post op day #14 (11/13/2023)if applicable.  Patient further advised to follow up with Dr. Martinez in his office 3-4 weeks post op.     History of Present Illness:   89M PMHx AF (no AC), HTN, COPD, & CAD, dry skin transferred presented to outside hospital s/p mechanical fall at home. XR Rt femur showed Rt femoral neck fx & CTH showed type II odontoid process fx. Remains in C-collar. Pt was also found to have aortic stenosis & was transferred to [facility] further workup & management. Patient was found to have a right hip fracture as well as a type 2 dens fracture.     PAST MEDICAL & SURGICAL HISTORY:  Unspecified atrial fibrillation  COPD without exacerbation  Hypertension  CAD (coronary artery disease)      HOSPITAL COURSE:  89 y/o M admitted to [facility] CTU as a transfer from Outside hospital on 10/29/23 following syncopal fall at home, for surgical intervention of a right femoral neck fx, as well as a Type II odontoid fx. Trauma team consulted, as well as neurosurgery consulted for management of injuries.   Neurosurgery recommended nonsurgical management of the odontoid fx, with C-collar when OOB. (Rigid aspen cervical collar ordered and fitted to patient at bedside). Patient recommended outpatient fu with Dr. Burt.   Structural heart team consulted due to history of severe AS, recommendations followed. Determined no indication for TAVR, optimized for surgical intervention on right hip fx.   Following medical and cardiology and trauma optimization and clearance, patient underwent right hip hemiarthroplasty on 10/31/2023 with Dr. Martinez.  Patient tolerated procedure well, required pressors intraop but off post op. SICU consulted and cleared to disposition to the orthopedic floor.   Patient placed on ANTERIOR hip precautions. Made WBAT on right leg. DVT ppx: Lovenox QD/ASA 81mg QD started X 6 weeks postop.  Wound consult placed for evaluation of right elbow wound/Skin tear.     Right elbow wound care Recommendations:  1) continue turning and positioning q2 and PRN utilizing offloading assistive devices  2) continue with routine pericare daily and PRN soiling  3) encourage optimal nutrition  4) waffle cushion when oob to chair  5) B/L LE complete cair air fluidized boots OR nanci lock pillow to offload heels/feet  6) triad protective barrier cream to B/L buttocks/sacrum daily and PRN soiling  7) right elbow - cleanse skin and pat dry then apply cavilon to periwound and lay adaptic on base of wound then paste medihoney gel onto adaptic over the wound bed. cover with allevyn foam, change daily and/or PRN soiling    Internal medicine consulted on 11/1/23 for co-management of comorbidities. Recommendations followed.   11/3/23: Patient with fall off of the bed, found sitting on the floor. 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  No acute fractures noticed.  Discussion had w pt family regarding Tx.  The understand that the pt is very high risk for surgery.  They will accept the LLD over risking another surgical intervention.  Pt is WBAT.  Shoe lift as outpt.     Patient was evaluated postoperatively by physical and occupational therapists for weight bearing as tolerated ambulation  and advised that patient would benefit from admission to rehab facility.   Discharge and Orthopedic Care instructions were delineated in the Discharge Plan and reviewed with the patient. All medications were delineated in the medication reconciliation tool and key points were reviewed with the patient. They were deemed stable from an Orthopedic & medical standpoint for discharge to rehab once bed/auth available.

## 2023-11-01 NOTE — PHYSICAL THERAPY INITIAL EVALUATION ADULT - IMPAIRMENTS CONTRIBUTING TO GAIT DEVIATIONS, PT EVAL
impaired balance/impaired coordination/impaired motor control/narrow base of support/pain/decreased strength

## 2023-11-01 NOTE — ADVANCED PRACTICE NURSE CONSULT - RECOMMEDATIONS
Impression:    right elbow wound with slough    Recommendations:    1) continue turning and positioning q2 and PRN utilizing offloading assistive devices  2) continue with routine pericare daily and PRN soiling  3) encourage optimal nutrition  4) waffle cushion when oob to chair  5) B/L LE complete cair air fluidized boots OR nanci lock pillow to offload heels/feet  6) triad protective barrier cream to B/L buttocks/sacrum daily and PRN soiling  7) right elbow - cleanse skin and pat dry then apply cavilon to periwound and lay adaptic on base of wound then paste medihoney gel onto adaptic over the wound bed. cover with allevyn foam, change daily and/or PRN soiling    Plan discussed with KATARZYNA Schuster at bedside    For questions or comments regarding this consult please call Shelley at [phone]. Thank you.

## 2023-11-01 NOTE — OCCUPATIONAL THERAPY INITIAL EVALUATION ADULT - PERTINENT HX OF CURRENT PROBLEM, REHAB EVAL
89M PMHx AF (no AC), HTN, COPD, & CAD, dry skin transferred presented to outside hospital s/p mechanical fall at home. XR Rt femur showed Rt femoral neck fx & CTH showed type II odontoid process fx. Remains in C-collar. Pt was also found to have aortic stenosis & was transferred to [facility] under Dr. Almeida for further workup & management.Pt now  s/p R hip janeen, WBAT.  CT PELVIS:1.  Acute impacted right femoral neck fracture as described.2.  Please see the separately dictated CT abdomen and pelvis for additional findingsCT ABDOMEN/PELVIS:  Right femoral neck fracture. Please refer to the dedicated CT pelvis report for further discussion.*  Mild loss of height from the superior endplate of L4, which may reflect a mild age indeterminate compression fracture.*  Nondisplaced right posterolateral 11th rib fracture, favored to be chronic. Recommend correlation for point tenderness. Chronic right posterolateral 12th rib fracture.*  Emphysema.*  Consolidative opacity in the right lower lobe, which may represent pneumonia, atelectasis, or combination thereof. Recommend follow-up CT imaging to ensure resolution.*  Small bilateral pleural effusions.*  Bilateral renal lesions of varying densities, likely cysts of varying proteinaceous/hemorrhagic content. However, several lesions are indeterminate or are too small to accurately characterize. Consider further evaluation with contrast enhanced abdominal MRI as clinically warranted.*  Urinary bladder wall thickening, which could be due to underdistention   or chronic bladder outlet obstruction. Cystitis is not excluded. Recommend correlation with urinalysis.CT HEAD:1.  No evidence of acute intracranial hemorrhage or midline shift.2.  Chronic small vessel disease.CT CERVICAL SPINE:1.  No evidence of acute osseous fracture or snow dislocation.2.  Multilevel degenerative change of the cervical spine as discussed above.3.  Calcified panus mildly effacing the spinal canal. Consider MRI of the cervical spine for further evaluation if clinically indicated.4.  Partially calcified lung nodule in the left lung apex. Recommend follow-up imaging is per Fleischner criteria. XRAY FEMUR/KNEE:Acute superolaterally offset impacted right femoral neck subcapital fracture. No dislocations or additional fractures in remaining imaged regions.Intact pelvic and obturator rings and symmetrically aligned and spaced SI joints and pubic symphysis.Advanced lower lumbar spine degenerative change apparent. Preserved bilateral hip joint spaces. Narrowed appearing right medial tibiofemoral   joint space. Intra-articular lateral tibiofemoral chondrocalcinosis.Generalized osteopenia otherwise no discrete lytic or blastic lesions.Vascular calcifications. XRAY CHEST: Small right pleural effusion. General

## 2023-11-01 NOTE — ADVANCED PRACTICE NURSE CONSULT - REASON FOR CONSULT
Wound care consult initiated by RN to assess patient's skin for a possible right elbow skin tear    History of Present Illness:   89M PMHx AF (no AC), HTN, COPD, & CAD, dry skin transferred presented to outside hospital s/p mechanical fall at home. XR Rt femur showed Rt femoral neck fx & CTH showed type II odontoid process fx. Remains in C-collar. Pt was also found to have aortic stenosis & was transferred to [facility] under Dr. Almeida for further workup & management.

## 2023-11-01 NOTE — DISCHARGE NOTE PROVIDER - NSDCCPCAREPLAN_GEN_ALL_CORE_FT
PRINCIPAL DISCHARGE DIAGNOSIS  Diagnosis: Fracture of femoral neck, right  Assessment and Plan of Treatment:

## 2023-11-01 NOTE — DISCHARGE NOTE PROVIDER - NSDCMRMEDTOKEN_GEN_ALL_CORE_FT
C-Collar: Non-op Odontoid Fx   amLODIPine 2.5 mg oral tablet: 1 tab(s) orally once a day  C-Collar: Non-op Odontoid Fx  furosemide 20 mg oral tablet: 1 tab(s) orally once a day   acetaminophen 325 mg oral tablet: 3 tab(s) orally every 8 hours X 5 days, then as needed for pain  amLODIPine 2.5 mg oral tablet: 1 tab(s) orally once a day  aspirin 81 mg oral delayed release tablet: 1 tab(s) orally once a day  C-Collar: Non-op Odontoid Fx. Must wear for out of bed/ambulation.  enoxaparin: 40 milligram(s) subcutaneously once a day X 6 weeks to prevent blood clots  furosemide 20 mg oral tablet: 1 tab(s) orally once a day  pantoprazole 40 mg oral delayed release tablet: 1 tab(s) orally once a day (before a meal)  polyethylene glycol 3350 oral powder for reconstitution: 17 gram(s) orally once a day  senna leaf extract oral tablet: 2 tab(s) orally once a day (at bedtime)   acetaminophen 325 mg oral tablet: 3 tab(s) orally every 8 hours X 5 days, then as needed for pain  amLODIPine 2.5 mg oral tablet: 1 tab(s) orally once a day  aspirin 81 mg oral delayed release tablet: 1 tab(s) orally once a day  enoxaparin: 30 milligram(s) subcutaneous once a day x 6 weeks post op for DVT ppx  furosemide 20 mg oral tablet: 1 tab(s) orally once a day  ipratropium-albuterol 0.5 mg-2.5 mg/3 mL inhalation solution: 3 milliliter(s) inhaled every 6 hours  pantoprazole 40 mg oral delayed release tablet: 1 tab(s) orally once a day (before a meal)  polyethylene glycol 3350 oral powder for reconstitution: 17 gram(s) orally once a day  senna leaf extract oral tablet: 2 tab(s) orally once a day (at bedtime)   acetaminophen 325 mg oral tablet: 3 tab(s) orally every 8 hours X 5 days, then as needed for pain  amLODIPine 2.5 mg oral tablet: 1 tab(s) orally once a day  aspirin 81 mg oral delayed release tablet: 1 tab(s) orally once a day  enoxaparin: 30 milligram(s) subcutaneous once a day x 6 weeks post op for DVT ppx  furosemide 20 mg oral tablet: 1 tab(s) orally once a day  hydrocortisone 1% topical cream: 1 Apply topically to affected area once a day  ipratropium-albuterol 0.5 mg-2.5 mg/3 mL inhalation solution: 3 milliliter(s) inhaled every 6 hours  pantoprazole 40 mg oral delayed release tablet: 1 tab(s) orally once a day (before a meal)  polyethylene glycol 3350 oral powder for reconstitution: 17 gram(s) orally once a day  senna leaf extract oral tablet: 2 tab(s) orally once a day (at bedtime)

## 2023-11-01 NOTE — PHYSICAL THERAPY INITIAL EVALUATION ADULT - LIVES WITH, PROFILE
lives with son in  a house with 3 steps to enter +1 rail. then has 1 flight inside to his bedroom however there is a bedroom on 1st floor/children

## 2023-11-01 NOTE — CONSULT NOTE ADULT - PROBLEM SELECTOR RECOMMENDATION 4
continue to monitor rate  continue 81 ng asa  Patient not current on meds for rate control  continue to monitor rate will add meds as needed

## 2023-11-02 LAB
ANION GAP SERPL CALC-SCNC: 11 MMOL/L — SIGNIFICANT CHANGE UP (ref 5–17)
ANION GAP SERPL CALC-SCNC: 11 MMOL/L — SIGNIFICANT CHANGE UP (ref 5–17)
BUN SERPL-MCNC: 36 MG/DL — HIGH (ref 7–23)
BUN SERPL-MCNC: 36 MG/DL — HIGH (ref 7–23)
CALCIUM SERPL-MCNC: 8.3 MG/DL — LOW (ref 8.4–10.5)
CALCIUM SERPL-MCNC: 8.3 MG/DL — LOW (ref 8.4–10.5)
CHLORIDE SERPL-SCNC: 112 MMOL/L — HIGH (ref 96–108)
CHLORIDE SERPL-SCNC: 112 MMOL/L — HIGH (ref 96–108)
CO2 SERPL-SCNC: 20 MMOL/L — LOW (ref 22–31)
CO2 SERPL-SCNC: 20 MMOL/L — LOW (ref 22–31)
CREAT SERPL-MCNC: 1.26 MG/DL — SIGNIFICANT CHANGE UP (ref 0.5–1.3)
CREAT SERPL-MCNC: 1.26 MG/DL — SIGNIFICANT CHANGE UP (ref 0.5–1.3)
CULTURE RESULTS: NO GROWTH — SIGNIFICANT CHANGE UP
CULTURE RESULTS: NO GROWTH — SIGNIFICANT CHANGE UP
EGFR: 50 ML/MIN/1.73M2 — LOW
EGFR: 50 ML/MIN/1.73M2 — LOW
GLUCOSE SERPL-MCNC: 95 MG/DL — SIGNIFICANT CHANGE UP (ref 70–99)
GLUCOSE SERPL-MCNC: 95 MG/DL — SIGNIFICANT CHANGE UP (ref 70–99)
HCT VFR BLD CALC: 30 % — LOW (ref 39–50)
HCT VFR BLD CALC: 30 % — LOW (ref 39–50)
HGB BLD-MCNC: 9.3 G/DL — LOW (ref 13–17)
HGB BLD-MCNC: 9.3 G/DL — LOW (ref 13–17)
MCHC RBC-ENTMCNC: 28.4 PG — SIGNIFICANT CHANGE UP (ref 27–34)
MCHC RBC-ENTMCNC: 28.4 PG — SIGNIFICANT CHANGE UP (ref 27–34)
MCHC RBC-ENTMCNC: 31 GM/DL — LOW (ref 32–36)
MCHC RBC-ENTMCNC: 31 GM/DL — LOW (ref 32–36)
MCV RBC AUTO: 91.7 FL — SIGNIFICANT CHANGE UP (ref 80–100)
MCV RBC AUTO: 91.7 FL — SIGNIFICANT CHANGE UP (ref 80–100)
NRBC # BLD: 0 /100 WBCS — SIGNIFICANT CHANGE UP (ref 0–0)
NRBC # BLD: 0 /100 WBCS — SIGNIFICANT CHANGE UP (ref 0–0)
PLATELET # BLD AUTO: 205 K/UL — SIGNIFICANT CHANGE UP (ref 150–400)
PLATELET # BLD AUTO: 205 K/UL — SIGNIFICANT CHANGE UP (ref 150–400)
POTASSIUM SERPL-MCNC: 4.1 MMOL/L — SIGNIFICANT CHANGE UP (ref 3.5–5.3)
POTASSIUM SERPL-MCNC: 4.1 MMOL/L — SIGNIFICANT CHANGE UP (ref 3.5–5.3)
POTASSIUM SERPL-SCNC: 4.1 MMOL/L — SIGNIFICANT CHANGE UP (ref 3.5–5.3)
POTASSIUM SERPL-SCNC: 4.1 MMOL/L — SIGNIFICANT CHANGE UP (ref 3.5–5.3)
RBC # BLD: 3.27 M/UL — LOW (ref 4.2–5.8)
RBC # BLD: 3.27 M/UL — LOW (ref 4.2–5.8)
RBC # FLD: 17.6 % — HIGH (ref 10.3–14.5)
RBC # FLD: 17.6 % — HIGH (ref 10.3–14.5)
SODIUM SERPL-SCNC: 143 MMOL/L — SIGNIFICANT CHANGE UP (ref 135–145)
SODIUM SERPL-SCNC: 143 MMOL/L — SIGNIFICANT CHANGE UP (ref 135–145)
SPECIMEN SOURCE: SIGNIFICANT CHANGE UP
SPECIMEN SOURCE: SIGNIFICANT CHANGE UP
WBC # BLD: 6.58 K/UL — SIGNIFICANT CHANGE UP (ref 3.8–10.5)
WBC # BLD: 6.58 K/UL — SIGNIFICANT CHANGE UP (ref 3.8–10.5)
WBC # FLD AUTO: 6.58 K/UL — SIGNIFICANT CHANGE UP (ref 3.8–10.5)
WBC # FLD AUTO: 6.58 K/UL — SIGNIFICANT CHANGE UP (ref 3.8–10.5)

## 2023-11-02 RX ORDER — ACETAMINOPHEN 500 MG
1000 TABLET ORAL ONCE
Refills: 0 | Status: COMPLETED | OUTPATIENT
Start: 2023-11-02 | End: 2023-11-02

## 2023-11-02 RX ORDER — ACETAMINOPHEN 500 MG
975 TABLET ORAL EVERY 8 HOURS
Refills: 0 | Status: DISCONTINUED | OUTPATIENT
Start: 2023-11-02 | End: 2023-11-07

## 2023-11-02 RX ADMIN — AMLODIPINE BESYLATE 2.5 MILLIGRAM(S): 2.5 TABLET ORAL at 06:02

## 2023-11-02 RX ADMIN — Medication 1000 MILLIGRAM(S): at 03:47

## 2023-11-02 RX ADMIN — Medication 20 MILLIGRAM(S): at 06:02

## 2023-11-02 RX ADMIN — ENOXAPARIN SODIUM 40 MILLIGRAM(S): 100 INJECTION SUBCUTANEOUS at 12:21

## 2023-11-02 RX ADMIN — Medication 400 MILLIGRAM(S): at 02:47

## 2023-11-02 RX ADMIN — Medication 81 MILLIGRAM(S): at 12:20

## 2023-11-02 RX ADMIN — LIDOCAINE 1 PATCH: 4 CREAM TOPICAL at 19:00

## 2023-11-02 RX ADMIN — LIDOCAINE 1 PATCH: 4 CREAM TOPICAL at 12:21

## 2023-11-02 NOTE — PROGRESS NOTE ADULT - SUBJECTIVE AND OBJECTIVE BOX
Ortho Progress Note    S: Patient seen and examined. No acute events overnight. Pain well controlled with current regimen. Denies lightheadedness/dizziness, CP/SOB. Tolerating diet.       O:  Physical Exam:  Gen: Laying in bed, NAD, alert and oriented.   Resp: Unlabored breathing  RLE: Dressing CDI,            EHL/FHL/TA/Sol intact          + SILT DP/SP/SALVADOR/Sa/Tib          +DP, extremity WWP    Vital Signs Last 24 Hrs  T(C): 36.3 (02 Nov 2023 04:16), Max: 36.4 (01 Nov 2023 08:48)  T(F): 97.3 (02 Nov 2023 04:16), Max: 97.5 (01 Nov 2023 08:48)  HR: 80 (02 Nov 2023 04:16) (73 - 92)  BP: 145/75 (02 Nov 2023 04:16) (95/56 - 145/75)  BP(mean): --  RR: 18 (02 Nov 2023 04:16) (18 - 18)  SpO2: 98% (02 Nov 2023 04:16) (95% - 100%)    Parameters below as of 02 Nov 2023 04:16  Patient On (Oxygen Delivery Method): nasal cannula  O2 Flow (L/min): 4                            9.4    7.17  )-----------( 212      ( 01 Nov 2023 07:27 )             30.1                         8.9    7.00  )-----------( 206      ( 31 Oct 2023 18:55 )             28.3       11-01    146<H>  |  111<H>  |  40<H>  ----------------------------<  80  4.3   |  19<L>  |  1.36<H>            A/P: 89M POD2 s/p R hip janeen, doing well  - Pain control  - DVT ppx ASA 81, Lovenox 40  - WBAT  - PT/OT  - Dispo planning

## 2023-11-02 NOTE — PROGRESS NOTE ADULT - SUBJECTIVE AND OBJECTIVE BOX
89M PMHx AF (no AC), HTN, COPD, & CAD, dry skin transferred presented to outside hospital s/p mechanical fall at home. XR Rt femur showed Rt femoral neck fx & CTH showed type II odontoid process fx. Remains in C-collar. Pt was also found to have aortic stenosis & was transferred to [facility]  further workup & management. Patient was found to have a right hip fracture as well as a type 2 dens fracture. Patient is s/p a hemiarthroplasty. Patient tolerated the procedure well. Patient seen now resting comfortably. Pain has been managed      MEDICATIONS  (STANDING):  amLODIPine   Tablet 2.5 milliGRAM(s) Oral daily  aspirin enteric coated 81 milliGRAM(s) Oral daily  enoxaparin Injectable 40 milliGRAM(s) SubCutaneous every 24 hours  furosemide    Tablet 20 milliGRAM(s) Oral daily  influenza  Vaccine (HIGH DOSE) 0.7 milliLiter(s) IntraMuscular once  lidocaine   4% Patch 1 Patch Transdermal daily  polyethylene glycol 3350 17 Gram(s) Oral daily  senna 2 Tablet(s) Oral at bedtime  sodium chloride 0.9%. 1000 milliLiter(s) (75 mL/Hr) IV Continuous <Continuous>    MEDICATIONS  (PRN):  ondansetron Injectable 4 milliGRAM(s) IV Push every 6 hours PRN Nausea and/or Vomiting          VITALS:   T(C): 36.3 (11-02-23 @ 08:01), Max: 36.4 (11-01-23 @ 17:16)  HR: 87 (11-02-23 @ 08:01) (73 - 92)  BP: 134/84 (11-02-23 @ 08:01) (95/56 - 145/75)  RR: 18 (11-02-23 @ 08:01) (18 - 18)  SpO2: 93% (11-02-23 @ 08:01) (93% - 98%)  Wt(kg): --      PHYSICAL EXAM:  GENERAL: NAD, well-groomed, well-developed  HEAD:  Atraumatic, Normocephalic  EYES: EOMI, PERRLA, conjunctiva and sclera clear  ENMT: No tonsillar erythema, exudates, or enlargement; Moist mucous membranes, Good dentition, No lesions  NECK: Supple, No JVD, Normal thyroid  NERVOUS SYSTEM:  Alert & Oriented X3, Good concentration; Motor Strength 5/5 B/L upper and lower extremities; DTRs 2+ intact and symmetric  CHEST/LUNG: Clear to percussion bilaterally; No rales, rhonchi, wheezing, or rubs  HEART: Regular rate and rhythm; No murmurs, rubs, or gallops  ABDOMEN: Soft, Nontender, Nondistended; Bowel sounds present  EXTREMITIES:  2+ Peripheral Pulses, No clubbing, cyanosis, or edema  LYMPH: No lymphadenopathy noted  SKIN: No rashes or lesions    LABS:        CBC Full  -  ( 02 Nov 2023 06:58 )  WBC Count : 6.58 K/uL  RBC Count : 3.27 M/uL  Hemoglobin : 9.3 g/dL  Hematocrit : 30.0 %  Platelet Count - Automated : 205 K/uL  Mean Cell Volume : 91.7 fl  Mean Cell Hemoglobin : 28.4 pg  Mean Cell Hemoglobin Concentration : 31.0 gm/dL  Auto Neutrophil # : x  Auto Lymphocyte # : x  Auto Monocyte # : x  Auto Eosinophil # : x  Auto Basophil # : x  Auto Neutrophil % : x  Auto Lymphocyte % : x  Auto Monocyte % : x  Auto Eosinophil % : x  Auto Basophil % : x    11-02    143  |  112<H>  |  36<H>  ----------------------------<  95  4.1   |  20<L>  |  1.26    Ca    8.3<L>      02 Nov 2023 06:58          Urinalysis Basic - ( 02 Nov 2023 06:58 )    Color: x / Appearance: x / SG: x / pH: x  Gluc: 95 mg/dL / Ketone: x  / Bili: x / Urobili: x   Blood: x / Protein: x / Nitrite: x   Leuk Esterase: x / RBC: x / WBC x   Sq Epi: x / Non Sq Epi: x / Bacteria: x      CAPILLARY BLOOD GLUCOSE          RADIOLOGY & ADDITIONAL TESTS:
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
## 2023-11-03 LAB
ANION GAP SERPL CALC-SCNC: 10 MMOL/L — SIGNIFICANT CHANGE UP (ref 5–17)
ANION GAP SERPL CALC-SCNC: 10 MMOL/L — SIGNIFICANT CHANGE UP (ref 5–17)
BUN SERPL-MCNC: 27 MG/DL — HIGH (ref 7–23)
BUN SERPL-MCNC: 27 MG/DL — HIGH (ref 7–23)
CALCIUM SERPL-MCNC: 8.4 MG/DL — SIGNIFICANT CHANGE UP (ref 8.4–10.5)
CALCIUM SERPL-MCNC: 8.4 MG/DL — SIGNIFICANT CHANGE UP (ref 8.4–10.5)
CHLORIDE SERPL-SCNC: 109 MMOL/L — HIGH (ref 96–108)
CHLORIDE SERPL-SCNC: 109 MMOL/L — HIGH (ref 96–108)
CO2 SERPL-SCNC: 22 MMOL/L — SIGNIFICANT CHANGE UP (ref 22–31)
CO2 SERPL-SCNC: 22 MMOL/L — SIGNIFICANT CHANGE UP (ref 22–31)
CREAT SERPL-MCNC: <0.3 MG/DL — LOW (ref 0.5–1.3)
CREAT SERPL-MCNC: <0.3 MG/DL — LOW (ref 0.5–1.3)
EGFR: 105 ML/MIN/1.73M2 — SIGNIFICANT CHANGE UP
EGFR: 105 ML/MIN/1.73M2 — SIGNIFICANT CHANGE UP
GLUCOSE SERPL-MCNC: 91 MG/DL — SIGNIFICANT CHANGE UP (ref 70–99)
GLUCOSE SERPL-MCNC: 91 MG/DL — SIGNIFICANT CHANGE UP (ref 70–99)
HCT VFR BLD CALC: 26.4 % — LOW (ref 39–50)
HCT VFR BLD CALC: 26.4 % — LOW (ref 39–50)
HGB BLD-MCNC: 8.6 G/DL — LOW (ref 13–17)
HGB BLD-MCNC: 8.6 G/DL — LOW (ref 13–17)
MCHC RBC-ENTMCNC: 29.2 PG — SIGNIFICANT CHANGE UP (ref 27–34)
MCHC RBC-ENTMCNC: 29.2 PG — SIGNIFICANT CHANGE UP (ref 27–34)
MCHC RBC-ENTMCNC: 32.6 GM/DL — SIGNIFICANT CHANGE UP (ref 32–36)
MCHC RBC-ENTMCNC: 32.6 GM/DL — SIGNIFICANT CHANGE UP (ref 32–36)
MCV RBC AUTO: 89.5 FL — SIGNIFICANT CHANGE UP (ref 80–100)
MCV RBC AUTO: 89.5 FL — SIGNIFICANT CHANGE UP (ref 80–100)
NRBC # BLD: 0 /100 WBCS — SIGNIFICANT CHANGE UP (ref 0–0)
NRBC # BLD: 0 /100 WBCS — SIGNIFICANT CHANGE UP (ref 0–0)
PLATELET # BLD AUTO: 204 K/UL — SIGNIFICANT CHANGE UP (ref 150–400)
PLATELET # BLD AUTO: 204 K/UL — SIGNIFICANT CHANGE UP (ref 150–400)
POTASSIUM SERPL-MCNC: 3.9 MMOL/L — SIGNIFICANT CHANGE UP (ref 3.5–5.3)
POTASSIUM SERPL-MCNC: 3.9 MMOL/L — SIGNIFICANT CHANGE UP (ref 3.5–5.3)
POTASSIUM SERPL-SCNC: 3.9 MMOL/L — SIGNIFICANT CHANGE UP (ref 3.5–5.3)
POTASSIUM SERPL-SCNC: 3.9 MMOL/L — SIGNIFICANT CHANGE UP (ref 3.5–5.3)
RBC # BLD: 2.95 M/UL — LOW (ref 4.2–5.8)
RBC # BLD: 2.95 M/UL — LOW (ref 4.2–5.8)
RBC # FLD: 17.2 % — HIGH (ref 10.3–14.5)
RBC # FLD: 17.2 % — HIGH (ref 10.3–14.5)
SODIUM SERPL-SCNC: 141 MMOL/L — SIGNIFICANT CHANGE UP (ref 135–145)
SODIUM SERPL-SCNC: 141 MMOL/L — SIGNIFICANT CHANGE UP (ref 135–145)
WBC # BLD: 6.8 K/UL — SIGNIFICANT CHANGE UP (ref 3.8–10.5)
WBC # BLD: 6.8 K/UL — SIGNIFICANT CHANGE UP (ref 3.8–10.5)
WBC # FLD AUTO: 6.8 K/UL — SIGNIFICANT CHANGE UP (ref 3.8–10.5)
WBC # FLD AUTO: 6.8 K/UL — SIGNIFICANT CHANGE UP (ref 3.8–10.5)

## 2023-11-03 PROCEDURE — 76377 3D RENDER W/INTRP POSTPROCES: CPT | Mod: 26

## 2023-11-03 PROCEDURE — 72192 CT PELVIS W/O DYE: CPT | Mod: 26

## 2023-11-03 PROCEDURE — 73502 X-RAY EXAM HIP UNI 2-3 VIEWS: CPT | Mod: 26,RT

## 2023-11-03 PROCEDURE — 72100 X-RAY EXAM L-S SPINE 2/3 VWS: CPT | Mod: 26

## 2023-11-03 PROCEDURE — 73552 X-RAY EXAM OF FEMUR 2/>: CPT | Mod: 26,RT

## 2023-11-03 RX ORDER — ENOXAPARIN SODIUM 100 MG/ML
40 INJECTION SUBCUTANEOUS
Qty: 0 | Refills: 0 | DISCHARGE
Start: 2023-11-03

## 2023-11-03 RX ORDER — ASPIRIN/CALCIUM CARB/MAGNESIUM 324 MG
1 TABLET ORAL
Qty: 0 | Refills: 0 | DISCHARGE
Start: 2023-11-03

## 2023-11-03 RX ORDER — ACETAMINOPHEN 500 MG
3 TABLET ORAL
Qty: 0 | Refills: 0 | DISCHARGE
Start: 2023-11-03

## 2023-11-03 RX ORDER — SENNA PLUS 8.6 MG/1
2 TABLET ORAL
Qty: 0 | Refills: 0 | DISCHARGE
Start: 2023-11-03

## 2023-11-03 RX ORDER — PANTOPRAZOLE SODIUM 20 MG/1
40 TABLET, DELAYED RELEASE ORAL
Refills: 0 | Status: DISCONTINUED | OUTPATIENT
Start: 2023-11-03 | End: 2023-11-07

## 2023-11-03 RX ORDER — PANTOPRAZOLE SODIUM 20 MG/1
1 TABLET, DELAYED RELEASE ORAL
Qty: 0 | Refills: 0 | DISCHARGE
Start: 2023-11-03

## 2023-11-03 RX ORDER — POLYETHYLENE GLYCOL 3350 17 G/17G
17 POWDER, FOR SOLUTION ORAL
Qty: 0 | Refills: 0 | DISCHARGE
Start: 2023-11-03

## 2023-11-03 RX ADMIN — Medication 975 MILLIGRAM(S): at 16:41

## 2023-11-03 RX ADMIN — PANTOPRAZOLE SODIUM 40 MILLIGRAM(S): 20 TABLET, DELAYED RELEASE ORAL at 09:23

## 2023-11-03 RX ADMIN — Medication 975 MILLIGRAM(S): at 17:30

## 2023-11-03 RX ADMIN — Medication 20 MILLIGRAM(S): at 05:55

## 2023-11-03 RX ADMIN — Medication 975 MILLIGRAM(S): at 05:55

## 2023-11-03 RX ADMIN — LIDOCAINE 1 PATCH: 4 CREAM TOPICAL at 19:00

## 2023-11-03 RX ADMIN — Medication 81 MILLIGRAM(S): at 11:34

## 2023-11-03 RX ADMIN — Medication 975 MILLIGRAM(S): at 21:12

## 2023-11-03 RX ADMIN — Medication 975 MILLIGRAM(S): at 06:55

## 2023-11-03 RX ADMIN — LIDOCAINE 1 PATCH: 4 CREAM TOPICAL at 16:40

## 2023-11-03 RX ADMIN — AMLODIPINE BESYLATE 2.5 MILLIGRAM(S): 2.5 TABLET ORAL at 05:55

## 2023-11-03 RX ADMIN — Medication 975 MILLIGRAM(S): at 22:12

## 2023-11-03 RX ADMIN — ENOXAPARIN SODIUM 40 MILLIGRAM(S): 100 INJECTION SUBCUTANEOUS at 16:41

## 2023-11-03 NOTE — PROVIDER CONTACT NOTE (FALL NOTIFICATION) - ACTION/TREATMENT ORDERED:
Pt. evaluated at bedside, by Lee LOUISE/Lilian/Alin, Multiple X-Rays ,CAT Scan ordered/Monitor Patient/PP/RN

## 2023-11-03 NOTE — PROGRESS NOTE ADULT - SUBJECTIVE AND OBJECTIVE BOX
Orthopaedic Surgery Progress Note    Subjective:   Patient seen and examined. No acute events overnight. States pain is controlled. Denies fever/chills/chest pain/shortness of breath/numbness/tingling.    Objective:  T(C): 36.5 (11-03-23 @ 04:43), Max: 36.5 (11-03-23 @ 04:43)  HR: 92 (11-03-23 @ 04:43) (87 - 92)  BP: 124/75 (11-03-23 @ 04:43) (106/69 - 134/84)  RR: 18 (11-03-23 @ 04:43) (18 - 18)  SpO2: 94% (11-03-23 @ 04:43) (93% - 100%)  Wt(kg): --    11-01 @ 07:01  -  11-02 @ 07:00  --------------------------------------------------------  IN: 960 mL / OUT: 1100 mL / NET: -140 mL    11-02 @ 07:01  -  11-03 @ 06:17  --------------------------------------------------------  IN: 480 mL / OUT: 700 mL / NET: -220 mL        Physical Exam:  Gen: Laying in bed, NAD, alert and oriented.   Resp: Unlabored breathing  RLE: Dressing CDI           EHL/FHL/TA/Sol intact          + SILT DP/SP/SALVADOR/Sa/Tib          +DP, extremity WWP                          9.3    6.58  )-----------( 205      ( 02 Nov 2023 06:58 )             30.0     11-02    143  |  112<H>  |  36<H>  ----------------------------<  95  4.1   |  20<L>  |  1.26    Ca    8.3<L>      02 Nov 2023 06:58        Urinalysis Basic - ( 02 Nov 2023 06:58 )    Color: x / Appearance: x / SG: x / pH: x  Gluc: 95 mg/dL / Ketone: x  / Bili: x / Urobili: x   Blood: x / Protein: x / Nitrite: x   Leuk Esterase: x / RBC: x / WBC x   Sq Epi: x / Non Sq Epi: x / Bacteria: x

## 2023-11-03 NOTE — PROGRESS NOTE ADULT - SUBJECTIVE AND OBJECTIVE BOX
89M PMHx AF (no AC), HTN, COPD, & CAD, dry skin transferred presented to outside hospital s/p mechanical fall at home. XR Rt femur showed Rt femoral neck fx & CTH showed type II odontoid process fx. Remains in C-collar. Pt was also found to have aortic stenosis & was transferred to [facility]  further workup & management. Patient was found to have a right hip fracture as well as a type 2 dens fracture. Patient is s/p a hemiarthroplasty. Patient tolerated the procedure well. Patient seen now resting comfortably. Pain has been managed. Patient has a fall in the room. No new issues      MEDICATIONS  (STANDING):  acetaminophen     Tablet .. 975 milliGRAM(s) Oral every 8 hours  amLODIPine   Tablet 2.5 milliGRAM(s) Oral daily  aspirin enteric coated 81 milliGRAM(s) Oral daily  enoxaparin Injectable 40 milliGRAM(s) SubCutaneous every 24 hours  furosemide    Tablet 20 milliGRAM(s) Oral daily  influenza  Vaccine (HIGH DOSE) 0.7 milliLiter(s) IntraMuscular once  lidocaine   4% Patch 1 Patch Transdermal daily  pantoprazole    Tablet 40 milliGRAM(s) Oral before breakfast  polyethylene glycol 3350 17 Gram(s) Oral daily  senna 2 Tablet(s) Oral at bedtime    MEDICATIONS  (PRN):  ondansetron Injectable 4 milliGRAM(s) IV Push every 6 hours PRN Nausea and/or Vomiting          VITALS:   T(C): 36.3 (11-03-23 @ 08:12), Max: 36.5 (11-03-23 @ 04:43)  HR: 89 (11-03-23 @ 08:12) (88 - 92)  BP: 99/61 (11-03-23 @ 08:12) (99/61 - 132/76)  RR: 17 (11-03-23 @ 08:12) (17 - 18)  SpO2: 93% (11-03-23 @ 08:12) (93% - 100%)  Wt(kg): --    PHYSICAL EXAM:  GENERAL: NAD, well-groomed, well-developed  HEAD:  Atraumatic, Normocephalic  EYES: EOMI, PERRLA, conjunctiva and sclera clear  ENMT: No tonsillar erythema, exudates, or enlargement; Moist mucous membranes, Good dentition, No lesions  NECK: Supple, No JVD, Normal thyroid  NERVOUS SYSTEM:  Alert & Oriented X3, Good concentration; Motor Strength 5/5 B/L upper and lower extremities; DTRs 2+ intact and symmetric  CHEST/LUNG: Clear to percussion bilaterally; No rales, rhonchi, wheezing, or rubs  HEART: Regular rate and rhythm; No murmurs, rubs, or gallops  ABDOMEN: Soft, Nontender, Nondistended; Bowel sounds present  EXTREMITIES:  2+ Peripheral Pulses, No clubbing, cyanosis, or edema  LYMPH: No lymphadenopathy noted  SKIN: No rashes or lesions    LABS:        CBC Full  -  ( 03 Nov 2023 06:45 )  WBC Count : 6.80 K/uL  RBC Count : 2.95 M/uL  Hemoglobin : 8.6 g/dL  Hematocrit : 26.4 %  Platelet Count - Automated : 204 K/uL  Mean Cell Volume : 89.5 fl  Mean Cell Hemoglobin : 29.2 pg  Mean Cell Hemoglobin Concentration : 32.6 gm/dL  Auto Neutrophil # : x  Auto Lymphocyte # : x  Auto Monocyte # : x  Auto Eosinophil # : x  Auto Basophil # : x  Auto Neutrophil % : x  Auto Lymphocyte % : x  Auto Monocyte % : x  Auto Eosinophil % : x  Auto Basophil % : x    11-03    141  |  109<H>  |  27<H>  ----------------------------<  91  3.9   |  22  |  <0.30<L>    Ca    8.4      03 Nov 2023 06:45          Urinalysis Basic - ( 03 Nov 2023 06:45 )    Color: x / Appearance: x / SG: x / pH: x  Gluc: 91 mg/dL / Ketone: x  / Bili: x / Urobili: x   Blood: x / Protein: x / Nitrite: x   Leuk Esterase: x / RBC: x / WBC x   Sq Epi: x / Non Sq Epi: x / Bacteria: x      CAPILLARY BLOOD GLUCOSE          RADIOLOGY & ADDITIONAL TESTS:

## 2023-11-04 RX ORDER — IPRATROPIUM/ALBUTEROL SULFATE 18-103MCG
3 AEROSOL WITH ADAPTER (GRAM) INHALATION EVERY 6 HOURS
Refills: 0 | Status: DISCONTINUED | OUTPATIENT
Start: 2023-11-04 | End: 2023-11-07

## 2023-11-04 RX ORDER — IPRATROPIUM/ALBUTEROL SULFATE 18-103MCG
3 AEROSOL WITH ADAPTER (GRAM) INHALATION
Qty: 0 | Refills: 0 | DISCHARGE
Start: 2023-11-04

## 2023-11-04 RX ADMIN — Medication 975 MILLIGRAM(S): at 06:27

## 2023-11-04 RX ADMIN — AMLODIPINE BESYLATE 2.5 MILLIGRAM(S): 2.5 TABLET ORAL at 05:41

## 2023-11-04 RX ADMIN — POLYETHYLENE GLYCOL 3350 17 GRAM(S): 17 POWDER, FOR SOLUTION ORAL at 11:15

## 2023-11-04 RX ADMIN — Medication 975 MILLIGRAM(S): at 05:40

## 2023-11-04 RX ADMIN — Medication 81 MILLIGRAM(S): at 11:15

## 2023-11-04 RX ADMIN — Medication 975 MILLIGRAM(S): at 21:28

## 2023-11-04 RX ADMIN — Medication 975 MILLIGRAM(S): at 21:58

## 2023-11-04 RX ADMIN — Medication 3 MILLILITER(S): at 17:23

## 2023-11-04 RX ADMIN — ENOXAPARIN SODIUM 40 MILLIGRAM(S): 100 INJECTION SUBCUTANEOUS at 16:07

## 2023-11-04 RX ADMIN — PANTOPRAZOLE SODIUM 40 MILLIGRAM(S): 20 TABLET, DELAYED RELEASE ORAL at 05:41

## 2023-11-04 RX ADMIN — Medication 20 MILLIGRAM(S): at 05:41

## 2023-11-04 RX ADMIN — SENNA PLUS 2 TABLET(S): 8.6 TABLET ORAL at 21:28

## 2023-11-04 RX ADMIN — LIDOCAINE 1 PATCH: 4 CREAM TOPICAL at 05:37

## 2023-11-04 NOTE — PROGRESS NOTE ADULT - SUBJECTIVE AND OBJECTIVE BOX
Post op Day 4    Patient resting without complaints at this time, resting on his bed comfortably. Patient currently on 1:1 with PCA at bedside. No acute overnight events.  No chest pain, SOB, N/V/D/HA.     T(C): 36.3 (11-04-23 @ 05:33), Max: 36.3 (11-03-23 @ 08:12)  HR: 92 (11-04-23 @ 05:33) (88 - 93)  BP: 133/82 (11-04-23 @ 05:33) (99/61 - 133/82)  RR: 18 (11-04-23 @ 05:33) (17 - 18)  SpO2: 100% (11-04-23 @ 05:33) (93% - 100%)  Wt(kg): --    Exam:  Alert and Oriented, No Acute Distress  Cardiac: Normal S1 & S2, RRR, No murmurs, rubs or gallops appreciated.  Pulmonary: 18bpm, normal breathing effort, no retractions, diminished lung sounds appreciated.  Bronchial/Vesicular lungs sounds appreciated throughout all lung lobes.  Lower Extremities:  RLE: Aquacel dressing CDI          EHL/FHL/TA/Sol intact          + SILT DP/SP/SALVADOR/Sa/Tib          +DP, extremity WWP    Xray:   Labs:                    8.6    6.80  )-----------( 204      ( 03 Nov 2023 06:45 )             26.4    11-03    141  |  109<H>  |  27<H>  ----------------------------<  91  3.9   |  22  |  <0.30<L>    Ca    8.4      03 Nov 2023 06:45

## 2023-11-04 NOTE — PROGRESS NOTE ADULT - SUBJECTIVE AND OBJECTIVE BOX
89M PMHx AF (no AC), HTN, COPD, & CAD, dry skin transferred presented to outside hospital s/p mechanical fall at home. XR Rt femur showed Rt femoral neck fx & CTH showed type II odontoid process fx. Remains in C-collar. Pt was also found to have aortic stenosis & was transferred to [facility]  further workup & management. Patient was found to have a right hip fracture as well as a type 2 dens fracture. Patient is s/p a hemiarthroplasty. Patient tolerated the procedure well. Patient seen now resting comfortably. Pain has been managed. Patient has a fall in the room. No new issues. imaging has been unremarkable. Patient states he is felling well       MEDICATIONS  (STANDING):  acetaminophen     Tablet .. 975 milliGRAM(s) Oral every 8 hours  albuterol/ipratropium for Nebulization 3 milliLiter(s) Nebulizer every 6 hours  amLODIPine   Tablet 2.5 milliGRAM(s) Oral daily  aspirin enteric coated 81 milliGRAM(s) Oral daily  enoxaparin Injectable 40 milliGRAM(s) SubCutaneous every 24 hours  furosemide    Tablet 20 milliGRAM(s) Oral daily  influenza  Vaccine (HIGH DOSE) 0.7 milliLiter(s) IntraMuscular once  pantoprazole    Tablet 40 milliGRAM(s) Oral before breakfast  polyethylene glycol 3350 17 Gram(s) Oral daily  senna 2 Tablet(s) Oral at bedtime    MEDICATIONS  (PRN):  ondansetron Injectable 4 milliGRAM(s) IV Push every 6 hours PRN Nausea and/or Vomiting          VITALS:   T(C): 36.3 (11-04-23 @ 05:33), Max: 36.3 (11-03-23 @ 20:39)  HR: 92 (11-04-23 @ 05:33) (88 - 92)  BP: 133/82 (11-04-23 @ 05:33) (111/68 - 133/82)  RR: 18 (11-04-23 @ 05:33) (18 - 18)  SpO2: 100% (11-04-23 @ 05:33) (96% - 100%)  Wt(kg): --      PHYSICAL EXAM:  GENERAL: NAD, well-groomed, well-developed  HEAD:  Atraumatic, Normocephalic  EYES: EOMI, PERRLA, conjunctiva and sclera clear  ENMT: No tonsillar erythema, exudates, or enlargement; Moist mucous membranes, Good dentition, No lesions  NECK: Supple, No JVD, Normal thyroid  NERVOUS SYSTEM:  Alert & Oriented X3, Good concentration; Motor Strength 5/5 B/L upper and lower extremities; DTRs 2+ intact and symmetric  CHEST/LUNG: Clear to percussion bilaterally; No rales, rhonchi, wheezing, or rubs  HEART: Regular rate and rhythm; No murmurs, rubs, or gallops  ABDOMEN: Soft, Nontender, Nondistended; Bowel sounds present  EXTREMITIES:  2+ Peripheral Pulses, No clubbing, cyanosis, or edema  LYMPH: No lymphadenopathy noted  SKIN: No rashes or lesions  LABS:        CBC Full  -  ( 03 Nov 2023 06:45 )  WBC Count : 6.80 K/uL  RBC Count : 2.95 M/uL  Hemoglobin : 8.6 g/dL  Hematocrit : 26.4 %  Platelet Count - Automated : 204 K/uL  Mean Cell Volume : 89.5 fl  Mean Cell Hemoglobin : 29.2 pg  Mean Cell Hemoglobin Concentration : 32.6 gm/dL  Auto Neutrophil # : x  Auto Lymphocyte # : x  Auto Monocyte # : x  Auto Eosinophil # : x  Auto Basophil # : x  Auto Neutrophil % : x  Auto Lymphocyte % : x  Auto Monocyte % : x  Auto Eosinophil % : x  Auto Basophil % : x    11-03    141  |  109<H>  |  27<H>  ----------------------------<  91  3.9   |  22  |  <0.30<L>    Ca    8.4      03 Nov 2023 06:45          Urinalysis Basic - ( 03 Nov 2023 06:45 )    Color: x / Appearance: x / SG: x / pH: x  Gluc: 91 mg/dL / Ketone: x  / Bili: x / Urobili: x   Blood: x / Protein: x / Nitrite: x   Leuk Esterase: x / RBC: x / WBC x   Sq Epi: x / Non Sq Epi: x / Bacteria: x      CAPILLARY BLOOD GLUCOSE          RADIOLOGY & ADDITIONAL TESTS:

## 2023-11-05 LAB
ANION GAP SERPL CALC-SCNC: 11 MMOL/L — SIGNIFICANT CHANGE UP (ref 5–17)
ANION GAP SERPL CALC-SCNC: 11 MMOL/L — SIGNIFICANT CHANGE UP (ref 5–17)
BUN SERPL-MCNC: 22 MG/DL — SIGNIFICANT CHANGE UP (ref 7–23)
BUN SERPL-MCNC: 22 MG/DL — SIGNIFICANT CHANGE UP (ref 7–23)
CALCIUM SERPL-MCNC: 8.7 MG/DL — SIGNIFICANT CHANGE UP (ref 8.4–10.5)
CALCIUM SERPL-MCNC: 8.7 MG/DL — SIGNIFICANT CHANGE UP (ref 8.4–10.5)
CHLORIDE SERPL-SCNC: 103 MMOL/L — SIGNIFICANT CHANGE UP (ref 96–108)
CHLORIDE SERPL-SCNC: 103 MMOL/L — SIGNIFICANT CHANGE UP (ref 96–108)
CO2 SERPL-SCNC: 24 MMOL/L — SIGNIFICANT CHANGE UP (ref 22–31)
CO2 SERPL-SCNC: 24 MMOL/L — SIGNIFICANT CHANGE UP (ref 22–31)
CREAT SERPL-MCNC: 1.18 MG/DL — SIGNIFICANT CHANGE UP (ref 0.5–1.3)
CREAT SERPL-MCNC: 1.18 MG/DL — SIGNIFICANT CHANGE UP (ref 0.5–1.3)
EGFR: 54 ML/MIN/1.73M2 — LOW
EGFR: 54 ML/MIN/1.73M2 — LOW
GLUCOSE SERPL-MCNC: 91 MG/DL — SIGNIFICANT CHANGE UP (ref 70–99)
GLUCOSE SERPL-MCNC: 91 MG/DL — SIGNIFICANT CHANGE UP (ref 70–99)
HCT VFR BLD CALC: 31.1 % — LOW (ref 39–50)
HCT VFR BLD CALC: 31.1 % — LOW (ref 39–50)
HGB BLD-MCNC: 9.9 G/DL — LOW (ref 13–17)
HGB BLD-MCNC: 9.9 G/DL — LOW (ref 13–17)
MCHC RBC-ENTMCNC: 28.5 PG — SIGNIFICANT CHANGE UP (ref 27–34)
MCHC RBC-ENTMCNC: 28.5 PG — SIGNIFICANT CHANGE UP (ref 27–34)
MCHC RBC-ENTMCNC: 31.8 GM/DL — LOW (ref 32–36)
MCHC RBC-ENTMCNC: 31.8 GM/DL — LOW (ref 32–36)
MCV RBC AUTO: 89.6 FL — SIGNIFICANT CHANGE UP (ref 80–100)
MCV RBC AUTO: 89.6 FL — SIGNIFICANT CHANGE UP (ref 80–100)
NRBC # BLD: 0 /100 WBCS — SIGNIFICANT CHANGE UP (ref 0–0)
NRBC # BLD: 0 /100 WBCS — SIGNIFICANT CHANGE UP (ref 0–0)
PLATELET # BLD AUTO: 240 K/UL — SIGNIFICANT CHANGE UP (ref 150–400)
PLATELET # BLD AUTO: 240 K/UL — SIGNIFICANT CHANGE UP (ref 150–400)
POTASSIUM SERPL-MCNC: 4.1 MMOL/L — SIGNIFICANT CHANGE UP (ref 3.5–5.3)
POTASSIUM SERPL-MCNC: 4.1 MMOL/L — SIGNIFICANT CHANGE UP (ref 3.5–5.3)
POTASSIUM SERPL-SCNC: 4.1 MMOL/L — SIGNIFICANT CHANGE UP (ref 3.5–5.3)
POTASSIUM SERPL-SCNC: 4.1 MMOL/L — SIGNIFICANT CHANGE UP (ref 3.5–5.3)
RBC # BLD: 3.47 M/UL — LOW (ref 4.2–5.8)
RBC # BLD: 3.47 M/UL — LOW (ref 4.2–5.8)
RBC # FLD: 17 % — HIGH (ref 10.3–14.5)
RBC # FLD: 17 % — HIGH (ref 10.3–14.5)
SODIUM SERPL-SCNC: 138 MMOL/L — SIGNIFICANT CHANGE UP (ref 135–145)
SODIUM SERPL-SCNC: 138 MMOL/L — SIGNIFICANT CHANGE UP (ref 135–145)
WBC # BLD: 6.62 K/UL — SIGNIFICANT CHANGE UP (ref 3.8–10.5)
WBC # BLD: 6.62 K/UL — SIGNIFICANT CHANGE UP (ref 3.8–10.5)
WBC # FLD AUTO: 6.62 K/UL — SIGNIFICANT CHANGE UP (ref 3.8–10.5)
WBC # FLD AUTO: 6.62 K/UL — SIGNIFICANT CHANGE UP (ref 3.8–10.5)

## 2023-11-05 RX ORDER — ENOXAPARIN SODIUM 100 MG/ML
30 INJECTION SUBCUTANEOUS
Qty: 0 | Refills: 0 | DISCHARGE
Start: 2023-11-05

## 2023-11-05 RX ORDER — ENOXAPARIN SODIUM 100 MG/ML
30 INJECTION SUBCUTANEOUS EVERY 24 HOURS
Refills: 0 | Status: DISCONTINUED | OUTPATIENT
Start: 2023-11-05 | End: 2023-11-07

## 2023-11-05 RX ADMIN — Medication 975 MILLIGRAM(S): at 06:35

## 2023-11-05 RX ADMIN — Medication 975 MILLIGRAM(S): at 21:43

## 2023-11-05 RX ADMIN — Medication 20 MILLIGRAM(S): at 06:05

## 2023-11-05 RX ADMIN — Medication 975 MILLIGRAM(S): at 22:38

## 2023-11-05 RX ADMIN — Medication 81 MILLIGRAM(S): at 13:02

## 2023-11-05 RX ADMIN — ENOXAPARIN SODIUM 30 MILLIGRAM(S): 100 INJECTION SUBCUTANEOUS at 16:10

## 2023-11-05 RX ADMIN — PANTOPRAZOLE SODIUM 40 MILLIGRAM(S): 20 TABLET, DELAYED RELEASE ORAL at 06:05

## 2023-11-05 RX ADMIN — Medication 3 MILLILITER(S): at 13:04

## 2023-11-05 RX ADMIN — Medication 975 MILLIGRAM(S): at 06:05

## 2023-11-05 RX ADMIN — Medication 3 MILLILITER(S): at 17:21

## 2023-11-05 RX ADMIN — AMLODIPINE BESYLATE 2.5 MILLIGRAM(S): 2.5 TABLET ORAL at 06:05

## 2023-11-05 RX ADMIN — Medication 3 MILLILITER(S): at 06:05

## 2023-11-05 NOTE — PROGRESS NOTE ADULT - SUBJECTIVE AND OBJECTIVE BOX
89M PMHx AF (no AC), HTN, COPD, & CAD, dry skin transferred presented to outside hospital s/p mechanical fall at home. XR Rt femur showed Rt femoral neck fx & CTH showed type II odontoid process fx. Remains in C-collar. Pt was also found to have aortic stenosis & was transferred to [facility]  further workup & management. Patient was found to have a right hip fracture as well as a type 2 dens fracture. Patient is s/p a hemiarthroplasty. Patient tolerated the procedure well. Patient seen now resting comfortably. Pain has been managed. Patient has a fall in the room. No new issues. imaging has been unremarkable. Patient states he is feeling well       MEDICATIONS  (STANDING):  acetaminophen     Tablet .. 975 milliGRAM(s) Oral every 8 hours  albuterol/ipratropium for Nebulization 3 milliLiter(s) Nebulizer every 6 hours  amLODIPine   Tablet 2.5 milliGRAM(s) Oral daily  aspirin enteric coated 81 milliGRAM(s) Oral daily  enoxaparin Injectable 30 milliGRAM(s) SubCutaneous every 24 hours  furosemide    Tablet 20 milliGRAM(s) Oral daily  influenza  Vaccine (HIGH DOSE) 0.7 milliLiter(s) IntraMuscular once  pantoprazole    Tablet 40 milliGRAM(s) Oral before breakfast  polyethylene glycol 3350 17 Gram(s) Oral daily  senna 2 Tablet(s) Oral at bedtime    MEDICATIONS  (PRN):  ondansetron Injectable 4 milliGRAM(s) IV Push every 6 hours PRN Nausea and/or Vomiting          VITALS:   T(C): 36.6 (11-05-23 @ 09:40), Max: 36.6 (11-04-23 @ 20:39)  HR: 93 (11-05-23 @ 09:40) (85 - 93)  BP: 100/63 (11-05-23 @ 09:40) (100/63 - 136/80)  RR: 18 (11-05-23 @ 09:40) (18 - 18)  SpO2: 96% (11-05-23 @ 09:40) (96% - 100%)  Wt(kg): --    PHYSICAL EXAM:  GENERAL: NAD, well-groomed, well-developed  HEAD:  Atraumatic, Normocephalic  EYES: EOMI, PERRLA, conjunctiva and sclera clear  ENMT: No tonsillar erythema, exudates, or enlargement; Moist mucous membranes, Good dentition, No lesions  NECK: Supple, No JVD, Normal thyroid  NERVOUS SYSTEM:  Alert & Oriented X3, Good concentration; Motor Strength 5/5 B/L upper and lower extremities; DTRs 2+ intact and symmetric  CHEST/LUNG: Clear to percussion bilaterally; No rales, rhonchi, wheezing, or rubs  HEART: Regular rate and rhythm; No murmurs, rubs, or gallops  ABDOMEN: Soft, Nontender, Nondistended; Bowel sounds present  EXTREMITIES:  2+ Peripheral Pulses, No clubbing, cyanosis, or edema  LYMPH: No lymphadenopathy noted  SKIN: No rashes or lesions    LABS:        CBC Full  -  ( 05 Nov 2023 06:44 )  WBC Count : 6.62 K/uL  RBC Count : 3.47 M/uL  Hemoglobin : 9.9 g/dL  Hematocrit : 31.1 %  Platelet Count - Automated : 240 K/uL  Mean Cell Volume : 89.6 fl  Mean Cell Hemoglobin : 28.5 pg  Mean Cell Hemoglobin Concentration : 31.8 gm/dL  Auto Neutrophil # : x  Auto Lymphocyte # : x  Auto Monocyte # : x  Auto Eosinophil # : x  Auto Basophil # : x  Auto Neutrophil % : x  Auto Lymphocyte % : x  Auto Monocyte % : x  Auto Eosinophil % : x  Auto Basophil % : x    11-05    138  |  103  |  22  ----------------------------<  91  4.1   |  24  |  1.18    Ca    8.7      05 Nov 2023 06:43          Urinalysis Basic - ( 05 Nov 2023 06:43 )    Color: x / Appearance: x / SG: x / pH: x  Gluc: 91 mg/dL / Ketone: x  / Bili: x / Urobili: x   Blood: x / Protein: x / Nitrite: x   Leuk Esterase: x / RBC: x / WBC x   Sq Epi: x / Non Sq Epi: x / Bacteria: x      CAPILLARY BLOOD GLUCOSE          RADIOLOGY & ADDITIONAL TESTS:

## 2023-11-05 NOTE — PROGRESS NOTE ADULT - SUBJECTIVE AND OBJECTIVE BOX
ORTHOPEDICS PROGRESS NOTE:     Subjective  Patient resting without complaints at this time, resting on his bed comfortably. Patient currently on 1:1 with PCA at bedside. No acute overnight events.  Denies chest pain, SOB, N/V/D/HA.     T(C): 36.3 (11-04-23 @ 05:33), Max: 36.3 (11-03-23 @ 08:12)  HR: 92 (11-04-23 @ 05:33) (88 - 93)  BP: 133/82 (11-04-23 @ 05:33) (99/61 - 133/82)  RR: 18 (11-04-23 @ 05:33) (17 - 18)  SpO2: 100% (11-04-23 @ 05:33) (93% - 100%)  Wt(kg): --    Exam:  Alert and Oriented, No Acute Distress  Lower Extremities:  RLE: Aquacel dressing CDI to right hip          EHL/FHL/TA/Sol intact         + SILT DP/SP/SALVADOR/Sa/Tib  Compartments are soft bilaterally.  Extremities are warm .  DP 2+ BLE     Labs:  CBC Full  -  ( 05 Nov 2023 06:44 )  WBC Count : 6.62 K/uL  RBC Count : 3.47 M/uL  Hemoglobin : 9.9 g/dL  Hematocrit : 31.1 %  Platelet Count - Automated : 240 K/uL  Mean Cell Volume : 89.6 fl  Mean Cell Hemoglobin : 28.5 pg  Mean Cell Hemoglobin Concentration : 31.8 gm/dL        11-05    138  |  103  |  22  ----------------------------<  91  4.1   |  24  |  1.18    Ca    8.7      05 Nov 2023 06:43

## 2023-11-06 DIAGNOSIS — Z20.822 CONTACT WITH AND (SUSPECTED) EXPOSURE TO COVID-19: ICD-10-CM

## 2023-11-06 LAB
SARS-COV-2 RNA SPEC QL NAA+PROBE: SIGNIFICANT CHANGE UP
SARS-COV-2 RNA SPEC QL NAA+PROBE: SIGNIFICANT CHANGE UP

## 2023-11-06 RX ADMIN — SENNA PLUS 2 TABLET(S): 8.6 TABLET ORAL at 21:16

## 2023-11-06 RX ADMIN — Medication 20 MILLIGRAM(S): at 05:34

## 2023-11-06 RX ADMIN — Medication 975 MILLIGRAM(S): at 13:08

## 2023-11-06 RX ADMIN — Medication 975 MILLIGRAM(S): at 14:10

## 2023-11-06 RX ADMIN — Medication 81 MILLIGRAM(S): at 12:10

## 2023-11-06 RX ADMIN — Medication 3 MILLILITER(S): at 17:01

## 2023-11-06 RX ADMIN — AMLODIPINE BESYLATE 2.5 MILLIGRAM(S): 2.5 TABLET ORAL at 05:34

## 2023-11-06 RX ADMIN — Medication 975 MILLIGRAM(S): at 05:09

## 2023-11-06 RX ADMIN — ENOXAPARIN SODIUM 30 MILLIGRAM(S): 100 INJECTION SUBCUTANEOUS at 17:02

## 2023-11-06 RX ADMIN — Medication 975 MILLIGRAM(S): at 21:16

## 2023-11-06 RX ADMIN — Medication 3 MILLILITER(S): at 05:33

## 2023-11-06 RX ADMIN — Medication 975 MILLIGRAM(S): at 22:16

## 2023-11-06 RX ADMIN — PANTOPRAZOLE SODIUM 40 MILLIGRAM(S): 20 TABLET, DELAYED RELEASE ORAL at 05:34

## 2023-11-06 RX ADMIN — Medication 975 MILLIGRAM(S): at 05:48

## 2023-11-06 RX ADMIN — Medication 3 MILLILITER(S): at 12:10

## 2023-11-06 NOTE — PROGRESS NOTE ADULT - SUBJECTIVE AND OBJECTIVE BOX
Patient comfortable  No complaints.   On 1:1.    T(C): 36.6 (11-06-23 @ 05:31), Max: 37.7 (11-05-23 @ 17:29)  HR: 84 (11-06-23 @ 05:31) (84 - 93)  BP: 107/61 (11-06-23 @ 05:31) (100/63 - 107/61)  RR: 18 (11-06-23 @ 05:31) (18 - 18)  SpO2: 97% (11-06-23 @ 05:31) (96% - 100%)      PHYSICAL EXAM:  Gen: Awake, pleasant, answers questions appropriately, follows commands  RLE: R Hip Aquacel Dressing C/D/I; compartments soft, dull sensation grossly intact to light touch; (+) DF/PF/EHL/FHL; (+) Distal Pulses; No Calf tenderness B/L, PAS     LABS:                        9.9    6.62  )-----------( 240      ( 05 Nov 2023 06:44 )             31.1     11-05    138  |  103  |  22  ----------------------------<  91  4.1   |  24  |  1.18    Ca    8.7      05 Nov 2023 06:43

## 2023-11-07 ENCOUNTER — TRANSCRIPTION ENCOUNTER (OUTPATIENT)
Age: 88
End: 2023-11-07

## 2023-11-07 VITALS
SYSTOLIC BLOOD PRESSURE: 97 MMHG | HEART RATE: 88 BPM | OXYGEN SATURATION: 91 % | TEMPERATURE: 98 F | RESPIRATION RATE: 18 BRPM | DIASTOLIC BLOOD PRESSURE: 60 MMHG

## 2023-11-07 DIAGNOSIS — R21 RASH AND OTHER NONSPECIFIC SKIN ERUPTION: ICD-10-CM

## 2023-11-07 PROCEDURE — 86901 BLOOD TYPING SEROLOGIC RH(D): CPT

## 2023-11-07 PROCEDURE — 70450 CT HEAD/BRAIN W/O DYE: CPT

## 2023-11-07 PROCEDURE — 87635 SARS-COV-2 COVID-19 AMP PRB: CPT

## 2023-11-07 PROCEDURE — 73562 X-RAY EXAM OF KNEE 3: CPT

## 2023-11-07 PROCEDURE — 97162 PT EVAL MOD COMPLEX 30 MIN: CPT

## 2023-11-07 PROCEDURE — 84443 ASSAY THYROID STIM HORMONE: CPT

## 2023-11-07 PROCEDURE — 97116 GAIT TRAINING THERAPY: CPT

## 2023-11-07 PROCEDURE — 83880 ASSAY OF NATRIURETIC PEPTIDE: CPT

## 2023-11-07 PROCEDURE — 83735 ASSAY OF MAGNESIUM: CPT

## 2023-11-07 PROCEDURE — 36415 COLL VENOUS BLD VENIPUNCTURE: CPT

## 2023-11-07 PROCEDURE — 73501 X-RAY EXAM HIP UNI 1 VIEW: CPT

## 2023-11-07 PROCEDURE — 94640 AIRWAY INHALATION TREATMENT: CPT

## 2023-11-07 PROCEDURE — 85027 COMPLETE CBC AUTOMATED: CPT

## 2023-11-07 PROCEDURE — 93005 ELECTROCARDIOGRAM TRACING: CPT

## 2023-11-07 PROCEDURE — C1776: CPT

## 2023-11-07 PROCEDURE — 86850 RBC ANTIBODY SCREEN: CPT

## 2023-11-07 PROCEDURE — 73551 X-RAY EXAM OF FEMUR 1: CPT

## 2023-11-07 PROCEDURE — 84439 ASSAY OF FREE THYROXINE: CPT

## 2023-11-07 PROCEDURE — 85520 HEPARIN ASSAY: CPT

## 2023-11-07 PROCEDURE — 86900 BLOOD TYPING SEROLOGIC ABO: CPT

## 2023-11-07 PROCEDURE — 85730 THROMBOPLASTIN TIME PARTIAL: CPT

## 2023-11-07 PROCEDURE — 97110 THERAPEUTIC EXERCISES: CPT

## 2023-11-07 PROCEDURE — 72100 X-RAY EXAM L-S SPINE 2/3 VWS: CPT

## 2023-11-07 PROCEDURE — 71045 X-RAY EXAM CHEST 1 VIEW: CPT

## 2023-11-07 PROCEDURE — 72192 CT PELVIS W/O DYE: CPT

## 2023-11-07 PROCEDURE — 83036 HEMOGLOBIN GLYCOSYLATED A1C: CPT

## 2023-11-07 PROCEDURE — 80053 COMPREHEN METABOLIC PANEL: CPT

## 2023-11-07 PROCEDURE — 73552 X-RAY EXAM OF FEMUR 2/>: CPT

## 2023-11-07 PROCEDURE — 97530 THERAPEUTIC ACTIVITIES: CPT

## 2023-11-07 PROCEDURE — 74018 RADEX ABDOMEN 1 VIEW: CPT

## 2023-11-07 PROCEDURE — 99223 1ST HOSP IP/OBS HIGH 75: CPT

## 2023-11-07 PROCEDURE — 76377 3D RENDER W/INTRP POSTPROCES: CPT

## 2023-11-07 PROCEDURE — 87640 STAPH A DNA AMP PROBE: CPT

## 2023-11-07 PROCEDURE — 87641 MR-STAPH DNA AMP PROBE: CPT

## 2023-11-07 PROCEDURE — 71250 CT THORAX DX C-: CPT

## 2023-11-07 PROCEDURE — 82962 GLUCOSE BLOOD TEST: CPT

## 2023-11-07 PROCEDURE — 80048 BASIC METABOLIC PNL TOTAL CA: CPT

## 2023-11-07 PROCEDURE — 74176 CT ABD & PELVIS W/O CONTRAST: CPT

## 2023-11-07 PROCEDURE — 85610 PROTHROMBIN TIME: CPT

## 2023-11-07 PROCEDURE — 72170 X-RAY EXAM OF PELVIS: CPT

## 2023-11-07 PROCEDURE — 72125 CT NECK SPINE W/O DYE: CPT

## 2023-11-07 PROCEDURE — 84100 ASSAY OF PHOSPHORUS: CPT

## 2023-11-07 PROCEDURE — 86923 COMPATIBILITY TEST ELECTRIC: CPT

## 2023-11-07 PROCEDURE — C1769: CPT

## 2023-11-07 PROCEDURE — 87086 URINE CULTURE/COLONY COUNT: CPT

## 2023-11-07 PROCEDURE — 97165 OT EVAL LOW COMPLEX 30 MIN: CPT

## 2023-11-07 PROCEDURE — C8929: CPT

## 2023-11-07 PROCEDURE — 73502 X-RAY EXAM HIP UNI 2-3 VIEWS: CPT

## 2023-11-07 PROCEDURE — 81001 URINALYSIS AUTO W/SCOPE: CPT

## 2023-11-07 PROCEDURE — 85025 COMPLETE CBC W/AUTO DIFF WBC: CPT

## 2023-11-07 RX ORDER — HYDROCORTISONE 1 %
1 OINTMENT (GRAM) TOPICAL DAILY
Refills: 0 | Status: DISCONTINUED | OUTPATIENT
Start: 2023-11-07 | End: 2023-11-07

## 2023-11-07 RX ORDER — HYDROCORTISONE 1 %
1 OINTMENT (GRAM) TOPICAL
Qty: 0 | Refills: 0 | DISCHARGE
Start: 2023-11-07

## 2023-11-07 RX ADMIN — AMLODIPINE BESYLATE 2.5 MILLIGRAM(S): 2.5 TABLET ORAL at 07:57

## 2023-11-07 RX ADMIN — PANTOPRAZOLE SODIUM 40 MILLIGRAM(S): 20 TABLET, DELAYED RELEASE ORAL at 08:01

## 2023-11-07 RX ADMIN — Medication 1 APPLICATION(S): at 13:11

## 2023-11-07 RX ADMIN — Medication 3 MILLILITER(S): at 05:55

## 2023-11-07 RX ADMIN — Medication 3 MILLILITER(S): at 13:10

## 2023-11-07 RX ADMIN — Medication 975 MILLIGRAM(S): at 05:55

## 2023-11-07 RX ADMIN — Medication 3 MILLILITER(S): at 08:02

## 2023-11-07 RX ADMIN — Medication 20 MILLIGRAM(S): at 05:55

## 2023-11-07 RX ADMIN — POLYETHYLENE GLYCOL 3350 17 GRAM(S): 17 POWDER, FOR SOLUTION ORAL at 13:10

## 2023-11-07 RX ADMIN — Medication 975 MILLIGRAM(S): at 06:55

## 2023-11-07 RX ADMIN — ENOXAPARIN SODIUM 30 MILLIGRAM(S): 100 INJECTION SUBCUTANEOUS at 15:33

## 2023-11-07 RX ADMIN — Medication 975 MILLIGRAM(S): at 13:09

## 2023-11-07 RX ADMIN — Medication 81 MILLIGRAM(S): at 13:10

## 2023-11-07 NOTE — PROGRESS NOTE ADULT - TIME BILLING
Discussed treatment plan with patient at bedside.
Discussed treatment plan with patient at bedside.  DC planning to rehab
[facility]
Discussed treatment plan with patient at bedside.
Discussed treatment plan with patient at bedside.
Discussed treatment plan with patient at bedside.  DC planning to rehab
Discussed treatment plan with patient at bedside.  DC planning to rehab
left normal/right normal

## 2023-11-07 NOTE — CONSULT NOTE ADULT - ASSESSMENT
___________________________  ASSESSMENT AND PLAN  #  ddx:     Recommendations:   -   - Dermatology will continue to follow.     The patient's chart was reviewed in addition to being seen and examined at bedside with the dermatology attending  _______________ .  Recommendations were communicated with the primary team.  Please page [phone] with a 10-digit call-back number for further related questions.    Caitlin Verdin MD  Resident Physician, PGY-3  [facility] Dermatology  Pager: [phone]  Office: [phone] ___________________________  ASSESSMENT AND PLAN  #Dermatitis   ddx: scabies vs BP vs eczematous, returned to perform skin scraping for evaluation of scabies; however, pt had already been discharged. Distribution lacks classic areas for scabies and no burrows noted on exam; however, given severe pruritus would recommend treating for scabies.      Recommendations:   - START permethrin Cream 5%: Apply to all areas of the body from the neck to soles of feet (given age please include: the hairline, neck, scalp, temple, and forehead); leave on for 8 to 14 hours before removing by washing (shower or bath). Sheets and towels should be laundered in high heat the day following treatment, repeat full course in 1 week.   - Day after permethrin treatment: START triamcinolone 0.1% ointment BID  2 weeks on / 1 week off. Repeat cycles as necessary.   - Would consider biopsy as outpatient.  - Would check bullous pemphigoid antibodies     The patient's chart was reviewed in addition to being seen and examined at bedside with the dermatology attending Dr. Mcdaniels.  Recommendations were communicated with the primary team.  Please page [phone] with a 10-digit call-back number for further related questions.    Caitlin Verdin MD  Resident Physician, PGY-3  [facility] Dermatology  Pager: [phone]  Office: [phone]

## 2023-11-07 NOTE — CONSULT NOTE ADULT - CONSULT REASON
odontoid fx
Hip fx
mechanical GLF
Hip fx
Pt required pressors intraop, currently in PACU, eval for ICU level of care
rash
severe AS
medicine evaluation

## 2023-11-07 NOTE — PROGRESS NOTE ADULT - PROBLEM SELECTOR PLAN 1
Patient tolerated the procedure well  Physical therapy as tolerated  PO as tolerated   DVT and GI prophylaxis.
Ortho following, will  need  hemiarthroplasty  NWB RLE   timing of OR- will need to have taken care of prior to High risk ROXANE, d/w Dr Watkins
Patient tolerated the procedure well  Physical therapy as tolerated  PO as tolerated   DVT and GI prophylaxis.
Ortho following, will  need  hemiarthroplasty  today  NWB RLE   OR-    for hip today    prior to High risk ROXANE, d/w Dr Zee
Patient tolerated the procedure well  Physical therapy as tolerated  PO as tolerated   DVT and GI prophylaxis.

## 2023-11-07 NOTE — PROGRESS NOTE ADULT - SUBJECTIVE AND OBJECTIVE BOX
ORTHO PROGRESS NOTE     Patient is status post right hemiarthroplasty, POD #7  Pt seen and examined at bedside, denies SOB, CP, Dizziness. N/V/D/HA.    No significant overnight events. Pain well controlled. Patient reports itchy rash most prominent to the chest. Notes history of skin issues x 15 years, has been following with dermatology. Rash is intermittent, noted to be eczema in the past.     Vital Signs Last 24 Hrs  T(C): 36.6 (07 Nov 2023 05:02), Max: 36.7 (06 Nov 2023 13:11)  T(F): 97.9 (07 Nov 2023 05:02), Max: 98 (06 Nov 2023 13:11)  HR: 90 (07 Nov 2023 05:02) (87 - 94)  BP: 139/80 (07 Nov 2023 05:02) (91/60 - 139/80)  BP(mean): --  RR: 18 (07 Nov 2023 05:02) (18 - 18)  SpO2: 96% (07 Nov 2023 05:02) (87% - 98%)    Parameters below as of 07 Nov 2023 05:02  Patient On (Oxygen Delivery Method): nasal cannula  O2 Flow (L/min): 2      Exam:   Gen: No apparent distress  RLE: Aquacel changed due to old drainage. Incision healing well with staples. No erythema, drainage, swelling or skin breakdown. New Aquacel replaced.   BLE: motor intact EHL/FHL/TA/GS .  Sensation is grossly intact to light touch in the distal extremities.    Compartments are soft bilaterally.  Extremities are warm .  DP 2+ BLE   Dry macular papular rash to the chest.

## 2023-11-07 NOTE — PROGRESS NOTE ADULT - PROBLEM SELECTOR PLAN 4
continue to monitor rate  continue 81 ng asa  Patient not current on meds for rate control  continue to monitor rate will add meds as needed.

## 2023-11-07 NOTE — PROGRESS NOTE ADULT - PROBLEM SELECTOR PROBLEM 2
Type II fracture of odontoid process

## 2023-11-07 NOTE — CONSULT NOTE ADULT - PROVIDER SPECIALTY LIST ADULT
Neurosurgery
Orthopedics
Dermatology
Orthopedics
SICU
Structural Heart
Trauma Surgery
Internal Medicine

## 2023-11-07 NOTE — PROGRESS NOTE ADULT - PROBLEM SELECTOR PLAN 2
neurosurgery , no surgical intervention, collar when oob
neurosurgery , no surgical intervention, collar when oob
Patient followed by neurosurgery  C Collar when OOB  continue to monitor.

## 2023-11-07 NOTE — PROGRESS NOTE ADULT - SUBJECTIVE AND OBJECTIVE BOX
89M PMHx AF (no AC), HTN, COPD, & CAD, dry skin transferred presented to outside hospital s/p mechanical fall at home. XR Rt femur showed Rt femoral neck fx & CTH showed type II odontoid process fx. Remains in C-collar. Pt was also found to have aortic stenosis & was transferred to [facility]  further workup & management. Patient was found to have a right hip fracture as well as a type 2 dens fracture. Patient is s/p a hemiarthroplasty. Patient tolerated the procedure well. Patient seen now resting comfortably. Pain has been managed. Patient has a fall in the room. No new issues. imaging has been unremarkable. Patient states he is feeling well . Patient was exposed to covid. Has been asymptomatic Patient found to have a rash on his chest       MEDICATIONS  (STANDING):  acetaminophen     Tablet .. 975 milliGRAM(s) Oral every 8 hours  albuterol/ipratropium for Nebulization 3 milliLiter(s) Nebulizer every 6 hours  amLODIPine   Tablet 2.5 milliGRAM(s) Oral daily  aspirin enteric coated 81 milliGRAM(s) Oral daily  enoxaparin Injectable 30 milliGRAM(s) SubCutaneous every 24 hours  furosemide    Tablet 20 milliGRAM(s) Oral daily  hydrocortisone 1% Cream 1 Application(s) Topical daily  influenza  Vaccine (HIGH DOSE) 0.7 milliLiter(s) IntraMuscular once  pantoprazole    Tablet 40 milliGRAM(s) Oral before breakfast  polyethylene glycol 3350 17 Gram(s) Oral daily  senna 2 Tablet(s) Oral at bedtime    MEDICATIONS  (PRN):  ondansetron Injectable 4 milliGRAM(s) IV Push every 6 hours PRN Nausea and/or Vomiting          VITALS:   T(C): 36.6 (11-07-23 @ 05:02), Max: 36.6 (11-07-23 @ 00:06)  HR: 90 (11-07-23 @ 05:02) (87 - 93)  BP: 139/80 (11-07-23 @ 05:02) (91/60 - 139/80)  RR: 18 (11-07-23 @ 05:02) (18 - 18)  SpO2: 96% (11-07-23 @ 05:02) (96% - 98%)  Wt(kg): --    PHYSICAL EXAM:  GENERAL: NAD, well-groomed, well-developed  HEAD:  Atraumatic, Normocephalic  EYES: EOMI, PERRLA, conjunctiva and sclera clear  ENMT: No tonsillar erythema, exudates, or enlargement; Moist mucous membranes, Good dentition, No lesions  NECK: Supple, No JVD, Normal thyroid  NERVOUS SYSTEM:  Alert & Oriented X3, Good concentration; Motor Strength 5/5 B/L upper and lower extremities; DTRs 2+ intact and symmetric  CHEST/LUNG: Clear to percussion bilaterally; No rales, rhonchi, wheezing, or rubs  HEART: Regular rate and rhythm; No murmurs, rubs, or gallops  ABDOMEN: Soft, Nontender, Nondistended; Bowel sounds present  EXTREMITIES:  2+ Peripheral Pulses, No clubbing, cyanosis, or edema  LYMPH: No lymphadenopathy noted  SKIN: macular rash    LABS:                      CAPILLARY BLOOD GLUCOSE          RADIOLOGY & ADDITIONAL TESTS:

## 2023-11-07 NOTE — PROGRESS NOTE ADULT - PROBLEM SELECTOR PROBLEM 1
Fracture of femoral neck, right

## 2023-11-07 NOTE — PROGRESS NOTE ADULT - PROBLEM/PLAN-5
126
DISPLAY PLAN FREE TEXT

## 2023-11-07 NOTE — DISCHARGE NOTE NURSING/CASE MANAGEMENT/SOCIAL WORK - NSDCPEFALRISK_GEN_ALL_CORE
For information on Fall & Injury Prevention, visit: [email or web address] OR  [email or web address] OR  https://www.cdc.gov/steadi/patient.html

## 2023-11-07 NOTE — PROGRESS NOTE ADULT - PROBLEM SELECTOR PLAN 3
continue to monitor  avoid large fluid shifts  Structural cardiology following.
ROXANE w/u
ROXANE w/u
continue to monitor  avoid large fluid shifts  Structural cardiology following.

## 2023-11-07 NOTE — CONSULT NOTE ADULT - ATTENDING COMMENTS
I agree with the above note and have personally seen and examined this patient. All pertinent films have been reviewed. Please refer to clinical documentation of the history, physical examinations, data summary, and both assessment and plan as documented above and with which I agree.    Patient with displaced right femoral neck fracture. Transferred to [facility] for evaluation for possible TVAR due to significant aortic stenosis. CT evaluated and stated not a candidate for TVAR currently. They recommend proceeding with R hemiarthroplasty. Dr Martinez my partner will be performing the surgery on 10/31 pending discussion with patient and family  Travis Cantrell MD  Attending Orthopedic Surgeon
89F syncope with ground-level fall on 10/27,  transferred from [facility] to [facility] CTU for severe aortic stenosis  seen and examined 10-29-23 @ 2315 as trauma consult.    GCS = 14 (confused)  hemodynamically stable  NC/AT  right pupil reactive  left surgical pupil  EOMI  c-collar in place  no chest wall tenderness  soft / NT / ND  pelvic girdle stable  no gross neurologic deficit      [facility] images  -CT head / c-spine - no intracranial bleed. nondisplaced type 2 dens fracture.    [facility] images  -right hip / femur / knee X-rays - displaced right femoral neck fracture      displaced right femoral neck fracture  -care as per ortho    type 2 dens fracture  -c-collar  -f/u spine surgery  -the risks of CTA in the elderly gentleman with stage 3 CKD outweigh the benefits of screening for blunt cerebrovascular injury    CT chest / abd / pelvis w/o contrast should be adequate to diagnosis any significant traumatic torso injuries in the patient.      I reviewed his labs.  care coordinated with CTU team.
I was physically present for the key portions of the evaluation and management (E/M) service provided.  I agree with the above history, physical, and plan which I have reviewed and edited where appropriate.

## 2023-11-07 NOTE — PROGRESS NOTE ADULT - PROBLEM SELECTOR PLAN 6
Patient sent for imaging   No new fractures  Ortho following
Patient sent for imaging   awaiting results  Ortho following
Patient sent for imaging   No new fractures  Ortho following

## 2023-11-07 NOTE — PROGRESS NOTE ADULT - ASSESSMENT
89 yo M w/ PMHx afib (no AC), HTN, COPD, & CAD, dry skin transferred presented to outside hospital s/p mechanical fall at home. Work up at OSH shows XR Rt femur showed Rt femoral neck fx & CTH showed type II odontoid process fx. Remains in C-collar. Pt was also found to have aortic stenosis & was transferred to [facility] CT ICU under Dr. Almeida for further workup & management.  Trauma surgery consulted for evaluation       Recommendations:   - No acute trauma surgical intervention   - Plan tertiary today  - OR today w/ ortho  - Rest of care per primary      ACS/Trauma  [id number] 
89 yo male presents after a fall at home. Patient found to have a right hip fracture as well as a type 2 dens fracture. Patient s/p a right hemiarthroplasty.
A/P: 89 y/o M POD# 6 s/p R Hemiarthroplasty    DVT ppx-  Lovenox 30mg SQ Daily, ASA 81mg PO Daily  RLE WBAT/Anterior Precautions  Pain management w Tylenol only  PT  OT  Gi ppx  Continue 1:1  Discharge planning to [facility]  Orthopedic Surgery  [id number]  
A/P: 89M POD3 s/p R hip janeen, doing well  - Pain control  - DVT ppx ASA 81, Lovenox 40  - WBAT  - PT/OT  - Dispo planning
A/P: 89M s/p R hip hemiarthroplasty, POD4. VSS. NAD.     - Pain control  - DVT ppx: ASA 81mg QD, Lovenox 40 mg QD  - GI ppx: Protonix 40 mg QD   - WBAT  - PT/OT  - Dispo planning: BENJAMIN Domínguez PA-C  Orthopedic Surgery  Pager #[phone]    
89 yo male presents after a fall at home. Patient found to have a right hip fracture as well as a type 2 dens fracture. Patient s/p a right hemiarthroplasty.
89M PMHx AF (no AC), HTN, COPD, & CAD, dry skin transferred presented to outside hospital s/p mechanical fall at home. XR Rt femur showed Rt femoral neck fx & CTH showed type II odontoid process fx. Remains in C-collar. Pt was also found to have aortic stenosis & was transferred to [facility] under Dr. Almeida for further workup & management.  Trauma surgery consulted    10/30 neurosurgery for c spine, ortho for hip fx  CT scan:< from: CT Cervical Spine No Cont (10.29.23 @ 23:58) >  PROCEDURE DATE:  10/29/2023          INTERPRETATION:  EXAM: CT HEAD AND CERVICAL SPINE WITHOUT INTRAVENOUS   CONTRAST    HISTORY: Trauma    TECHNIQUE: Multiple axial images were obtained at 5 mm intervals of the   head and cervical spine. Sagittal and coronal reformatted images were   obtained from the axial data set. The images were reviewed in brain and   bonewindows.      repeat tte with severe as:    < from: TTE W or WO Ultrasound Enhancing Agent (10.30.23 @ 08:32) >  CONCLUSIONS:      1. Technically difficult image quality.   2. Left ventricular cavity is small. Left ventricular wall thickness is normal. Left ventricular systolic function is normal with an ejection fraction of 63 % by Hickman's method of disks. There are no regional wall motion abnormalities seen.   3. Normal filling pressure. Analysis of left ventricular diastolic function and filling pressure is made challenging by the presence of atrial fibrillation.   4. Moderately enlarged right ventricular cavity size, wall thickness, and reduced systolic function.   5. The left atrium is severely dilated.   6. The right atrium is dilated in size.   7. There is moderate tricuspid regurgitation. Estimated pulmonary artery systolic pressure is 55 mmHg.   8. There is mild mitral regurgitation.   9. There is severe aortic stenosis. There is low flow, low gradient aortic stenosis with preserved EF. Left ventricular stroke volume is 32.2 ml ;left ventricular stroke volume index is 18.56 ml/m². The aortic valve acceleration time is 107 msec. The peak transaortic velocity is 3.26 m/s, peak transaortic gradient is 42.6 mmHg and mean transaortic gradient is 20.3 mmHg with an LVOT/aortic valve VTI ratio of 0.13. The aortic valve area is estimated at 0.41 cm² by the continuity equation. There is no evidence of aortic regurgitation.  10. No pericardial effusion seen.  11. No prior echocardiogram is available for comparison.    < end of copied text >    COMPARISON: No prior imaging is available for comparison    FINDINGS:    CT HEAD:  There is no acute intracranial hemorrhage. Areas of decreased attenuation   in the deep and periventricular white matter, compatible with chronic   small vessel disease. 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   Central parenchymal volume loss. There is no hydrocephalus. The   extra-axial spaces and basal cisterns are within normal limits. There is   no midline shift or mass effect present.    The cranial cervical junction is within normal limits. The sella is not   expanded. There is no depressed calvarial fracture. Mild mucosal   thickening in the ethmoid air cells. The mastoid air cells are well   aerated. The visualized orbits are status post cataract surgery.    CT CERVICAL SPINE:  The atlantodental interval is within normal limits. The lateral masses   are properly aligned. There is no facet subluxation or malalignment at   the craniovertebral or atlantoaxial articulations. There is no dens   fracture. There is no significant prevertebral soft tissue swelling.   Calcified pannus about the odontoid ligaments, which mildly effaces the   spinal canal.    Straightening of the cervical lordosis. Trace listhesis in the cervical   spine. Vertebral body height is well-maintained. Decreased intervertebral   disc height, most significantly C5-C7 with chronic endplate change. There   are no jumped or perched facets. There is no acute osseous fracture.    C2-C3: Small disc bulge. The left neural foramen is patent. Mild to   moderate narrowing of the right neural foramen. No narrowing of the   spinal canal. Facet arthropathy.    C3-C4: Small disc osteophyte complex. Mild narrowing of the right neural   foramen. Left neural foramen is patent. No narrowing of the spinal canal.   Facet arthropathy.    C4-C5: Small disc osteophyte complex. Mild narrowing of the right neural   foramen. Left neural foramen is patent. No narrowing of the spinal canal.   Facet arthropathy.    C5-C6:Disc osteophyte complex with uncovertebral joint hypertrophy. The   right neural foramen is patent. Mild to moderate narrowing of the left   neural foramen. Mild narrowing of the spinal canal. Facet arthropathy.    C6-C7: Disc osteophyte complex withuncovertebral joint hypertrophy.   Moderate narrowing of the bilateral neuroforamina, left greater than   right. Mild narrowing of the spinal canal. Facet arthropathy.    C7-T1: No significant disc bulge. The bilateral neuroforamina are patent.   No narrowing of the spinal canal.    The paraspinal muscles are unremarkable.    Visualized portions of the thyroid are unremarkable.    Emphysematous changes in the bilateral lung apices. Scarring in the right   lung apex. Partially calcified 1.1 cm nodule in the left lung apex.      IMPRESSION:    CT HEAD:  1.  No evidence of acute intracranial hemorrhage or midline shift.  2.  Chronic small vessel disease.    CT CERVICAL SPINE:  1.  No evidence of acute osseous fracture or snow dislocation.  2.  Multilevel degenerative change of the cervical spine as discussed   above.  3.  Calcified panus mildly effacing the spinal canal. Consider MRI of the   cervical spine for further evaluation if clinically indicated.  4.  Partially calcified lung nodulein the left lung apex. Recommend   follow-up imaging is per Fleischner criteria.    < end of copied text >  < from: CT Pelvis Reform No Cont (10.30.23 @ 00:19) >    IMPRESSION:  1.  Acute impacted right femoral neck fracture as described.  2.  Please see theseparately dictated CT abdomen and pelvis for   additional findings    < end of copied text >    Ortrho:  -Patient indicated for OR for R hip hemiarthroplasty  -patient being worked up by structural heart team for AS  PLEASE CONSULT NEUROSURGERY FOR RECS REGARDING ODONTOID FRACTURE GIVEN NEED FOR SURGERY/INTUBATION  f/u TTE   Pain control  -NWB RLE, bedrest  -OR pending plan from Structural heart team    neurosurgery:   CT C spine showing small fx at base of odontoid (type 2). Patient without neck complaints. Exam: RLE pain limited due to femoral fx, o/w intact    -No neurosurgical intervention  -C collar when OOB  -Outpatient FU with Dr. Carmel judge      
A/P:  Pt is a  89 yr old male s/p right hip hemarthroplasty, POD #7    - Pain control/ Analgesia  - DVT ppx Lovenox 30mg x 6 weeks post op, ASA 81 QD continued, SCDs  - PT/OT - wbat/oob    - Incentive Spirometer  - GI prophylaxis: Protonix  - Derm consult for rash. Hydrocortisone ointment ordered.   - notify Ortho for questions   - Dispo: planning to [facility] Dom[facility]ng[facility] PA-C  Orthopedic Surgery  Team Pager #[phone]
A/P: 89M s/p R hip hemiarthroplasty, POD4. VSS. NAD.   - Pain control  - DVT ppx: ASA 81mg QD, Lovenox 40 mg QD  - GI ppx: Protonix 40 mg QD   - WBAT  - PT/OT  - Dispo planning: BENJAMIN Cho PA-C  Orthopedic Surgery  Pager #[phone]    
89 yo M w/ PMHx afib (no AC), HTN, COPD, & CAD, dry skin transferred presented to outside hospital s/p mechanical fall at home. Work up at OSH shows XR Rt femur showed Rt femoral neck fx & CTH showed type II odontoid process fx. Remains in C-collar. Pt was also found to have aortic stenosis & was transferred to [facility] CT ICU under Dr. Almeida for further workup & management.  Trauma surgery consulted for evaluation. S/p R hip hemiarthroplasty 10/31.      Recommendations:   - No acute trauma surgical intervention   - Will complete tertiary today, patient was in the OR yesterday  - Rest of care per primary      ACS/Trauma  [id number] 
89M PMHx AF (no AC), HTN, COPD, & CAD, dry skin transferred presented to outside hospital s/p mechanical fall at home. XR Rt femur showed Rt femoral neck fx & CTH showed type II odontoid process fx. Remains in C-collar. Pt was also found to have aortic stenosis & was transferred to [facility] under Dr. Almeida for further workup & management.  Trauma surgery consulted    10/30 neurosurgery for c spine, ortho for hip fx  CT scan:< from: CT Cervical Spine No Cont (10.29.23 @ 23:58) >  PROCEDURE DATE:  10/29/2023          INTERPRETATION:  EXAM: CT HEAD AND CERVICAL SPINE WITHOUT INTRAVENOUS   CONTRAST    HISTORY: Trauma    TECHNIQUE: Multiple axial images were obtained at 5 mm intervals of the   head and cervical spine. Sagittal and coronal reformatted images were   obtained from the axial data set. The images were reviewed in brain and   bonewindows.      repeat tte with severe as:    < from: TTE W or WO Ultrasound Enhancing Agent (10.30.23 @ 08:32) >  CONCLUSIONS:      1. Technically difficult image quality.   2. Left ventricular cavity is small. Left ventricular wall thickness is normal. Left ventricular systolic function is normal with an ejection fraction of 63 % by Hickman's method of disks. There are no regional wall motion abnormalities seen.   3. Normal filling pressure. Analysis of left ventricular diastolic function and filling pressure is made challenging by the presence of atrial fibrillation.   4. Moderately enlarged right ventricular cavity size, wall thickness, and reduced systolic function.   5. The left atrium is severely dilated.   6. The right atrium is dilated in size.   7. There is moderate tricuspid regurgitation. Estimated pulmonary artery systolic pressure is 55 mmHg.   8. There is mild mitral regurgitation.   9. There is severe aortic stenosis. There is low flow, low gradient aortic stenosis with preserved EF. Left ventricular stroke volume is 32.2 ml ;left ventricular stroke volume index is 18.56 ml/m². The aortic valve acceleration time is 107 msec. The peak transaortic velocity is 3.26 m/s, peak transaortic gradient is 42.6 mmHg and mean transaortic gradient is 20.3 mmHg with an LVOT/aortic valve VTI ratio of 0.13. The aortic valve area is estimated at 0.41 cm² by the continuity equation. There is no evidence of aortic regurgitation.  10. No pericardial effusion seen.  11. No prior echocardiogram is available for comparison.    < end of copied text >    COMPARISON: No prior imaging is available for comparison    FINDINGS:    CT HEAD:  There is no acute intracranial hemorrhage. Areas of decreased attenuation   in the deep and periventricular white matter, compatible with chronic   small vessel disease. 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   Central parenchymal volume loss. There is no hydrocephalus. The   extra-axial spaces and basal cisterns are within normal limits. There is   no midline shift or mass effect present.    The cranial cervical junction is within normal limits. The sella is not   expanded. There is no depressed calvarial fracture. Mild mucosal   thickening in the ethmoid air cells. The mastoid air cells are well   aerated. The visualized orbits are status post cataract surgery.    CT CERVICAL SPINE:  The atlantodental interval is within normal limits. The lateral masses   are properly aligned. There is no facet subluxation or malalignment at   the craniovertebral or atlantoaxial articulations. There is no dens   fracture. There is no significant prevertebral soft tissue swelling.   Calcified pannus about the odontoid ligaments, which mildly effaces the   spinal canal.    Straightening of the cervical lordosis. Trace listhesis in the cervical   spine. Vertebral body height is well-maintained. Decreased intervertebral   disc height, most significantly C5-C7 with chronic endplate change. There   are no jumped or perched facets. There is no acute osseous fracture.    C2-C3: Small disc bulge. The left neural foramen is patent. Mild to   moderate narrowing of the right neural foramen. No narrowing of the   spinal canal. Facet arthropathy.    C3-C4: Small disc osteophyte complex. Mild narrowing of the right neural   foramen. Left neural foramen is patent. No narrowing of the spinal canal.   Facet arthropathy.    C4-C5: Small disc osteophyte complex. Mild narrowing of the right neural   foramen. Left neural foramen is patent. No narrowing of the spinal canal.   Facet arthropathy.    C5-C6:Disc osteophyte complex with uncovertebral joint hypertrophy. The   right neural foramen is patent. Mild to moderate narrowing of the left   neural foramen. Mild narrowing of the spinal canal. Facet arthropathy.    C6-C7: Disc osteophyte complex withuncovertebral joint hypertrophy.   Moderate narrowing of the bilateral neuroforamina, left greater than   right. Mild narrowing of the spinal canal. Facet arthropathy.    C7-T1: No significant disc bulge. The bilateral neuroforamina are patent.   No narrowing of the spinal canal.    The paraspinal muscles are unremarkable.    Visualized portions of the thyroid are unremarkable.    Emphysematous changes in the bilateral lung apices. Scarring in the right   lung apex. Partially calcified 1.1 cm nodule in the left lung apex.      IMPRESSION:    CT HEAD:  1.  No evidence of acute intracranial hemorrhage or midline shift.  2.  Chronic small vessel disease.    CT CERVICAL SPINE:  1.  No evidence of acute osseous fracture or snow dislocation.  2.  Multilevel degenerative change of the cervical spine as discussed   above.  3.  Calcified panus mildly effacing the spinal canal. Consider MRI of the   cervical spine for further evaluation if clinically indicated.  4.  Partially calcified lung nodulein the left lung apex. Recommend   follow-up imaging is per Fleischner criteria.    < end of copied text >  < from: CT Pelvis Reform No Cont (10.30.23 @ 00:19) >    IMPRESSION:  1.  Acute impacted right femoral neck fracture as described.  2.  Please see theseparately dictated CT abdomen and pelvis for   additional findings    < end of copied text >    Ortrho:  -Patient indicated for OR for R hip hemiarthroplasty  -patient being worked up by structural heart team for AS  PLEASE CONSULT NEUROSURGERY FOR RECS REGARDING ODONTOID FRACTURE GIVEN NEED FOR SURGERY/INTUBATION  f/u TTE   Pain control  -NWB RLE, bedrest  -OR pending plan from Structural heart team    neurosurgery:   CT C spine showing small fx at base of odontoid (type 2). Patient without neck complaints. Exam: RLE pain limited due to femoral fx, o/w intact    -No neurosurgical intervention  -C collar when OOB  10/31   vss   on one to one for confusion      
89 yo male presents after a fall at home. Patient found to have a right hip fracture as well as a type 2 dens fracture. Patient s/p a right hemiarthroplasty.
89 yo male presents after a fall at home. Patient found to have a right hip fracture as well as a type 2 dens fracture. Patient s/p a right hemiarthroplasty.

## 2023-11-07 NOTE — PROGRESS NOTE ADULT - PROBLEM SELECTOR PROBLEM 3
Aortic stenosis
Severe aortic stenosis
Severe aortic stenosis
Aortic stenosis

## 2023-11-07 NOTE — CONSULT NOTE ADULT - SUBJECTIVE AND OBJECTIVE BOX
HPI:  89M PMHx AF (no AC), HTN, COPD, & CAD, dry skin transferred presented to outside hospital s/p mechanical fall at home. XR Rt femur showed Rt femoral neck fx & CTH showed type II odontoid process fx. Remains in C-collar. Pt was also found to have aortic stenosis & was transferred to [facility] under Dr. Almeida for further workup & management. (29 Oct 2023 18:40)        PAST MEDICAL & SURGICAL HISTORY:  Unspecified atrial fibrillation      COPD without exacerbation      Hypertension      CAD (coronary artery disease)          Review of Systems:    General: no fevers/chills, no fatigue 	  Skin/Breast: see HPI  Ophthalmologic: no change in vision  ENT: no change in hearing  Respiratory and Thorax: no SOB or cough  Cardiovascular: no palpitations or chest pain  Gastrointestinal: no abdominal pain or blood in stool   Genitourinary: no dysuria or frequency  Musculoskeletal: no joint pains or weakness	  Neurological: no weakness, numbness , or tingling    MEDICATIONS  (STANDING):  acetaminophen     Tablet .. 975 milliGRAM(s) Oral every 8 hours  albuterol/ipratropium for Nebulization 3 milliLiter(s) Nebulizer every 6 hours  amLODIPine   Tablet 2.5 milliGRAM(s) Oral daily  aspirin enteric coated 81 milliGRAM(s) Oral daily  enoxaparin Injectable 30 milliGRAM(s) SubCutaneous every 24 hours  furosemide    Tablet 20 milliGRAM(s) Oral daily  hydrocortisone 1% Cream 1 Application(s) Topical daily  influenza  Vaccine (HIGH DOSE) 0.7 milliLiter(s) IntraMuscular once  pantoprazole    Tablet 40 milliGRAM(s) Oral before breakfast  polyethylene glycol 3350 17 Gram(s) Oral daily  senna 2 Tablet(s) Oral at bedtime    ALLERGIES: No Known Allergies        SOCIAL HISTORY:  ____________________________________  Social History:    FAMILY HISTORY:        VITAL SIGNS LAST 24 HOURS:  T(F): 97.9 (11-07 @ 05:02), Max: 97.9 (11-07 @ 00:06)  HR: 90 (11-07 @ 05:02) (87 - 93)  BP: 139/80 (11-07 @ 05:02) (91/60 - 139/80)  RR: 18 (11-07 @ 05:02) (18 - 18)    PHYSICAL EXAM:     The patient was alert and conversant and in no apparent distress.  OP showed no ulcerations  There was no visible lymphadenopathy.  Conjunctiva were non injected  There was no clubbing or edema of extremities.  The scalp, hair, face, eyebrows, lips, OP, neck, chest, back,   extremities X 4, nails were examined.  There was no hyperhidrosis or bromhidrosis.    Of note on skin exam:   ____________________________________    LABS:             HPI:  89M PMHx AF (no AC), HTN, COPD, & CAD, dry skin transferred presented to outside hospital s/p mechanical fall at home. XR Rt femur showed Rt femoral neck fx & CTH showed type II odontoid process fx. Remains in C-collar. Pt was also found to have aortic stenosis & was transferred to [facility] under Dr. Almeida for further workup & management. (29 Oct 2023 18:40)    Derm Hx:   Pt states he has had this rash x approx 15 yrs, did not have eczema as a child. Has been biopsied in the past and told it was eczema, not currently treating, very pruritic. Prev treated with phototherapy although only for 8 days also used topicals in the past, cannot recall names. None recently, no one at home is itchy.       PAST MEDICAL & SURGICAL HISTORY:  Unspecified atrial fibrillation      COPD without exacerbation      Hypertension      CAD (coronary artery disease)          Review of Systems:    General: Denies fever, chills  Neuro: Denies headache, paresthesias, dizziness  Eyes: Denies blurry vision, diplopia  CV: Denies chest pain, palpitations  Resp: Denies SOB, cough  : Denies dysuria, hematuria  GI: Denies abdominal pain, N/V  Heme: Denies weakness, tiredness  MSK: Endorses RLE pain  Psych: Denies anxiety, depression, michael  Vasc: Denies leg claudication    MEDICATIONS  (STANDING):  acetaminophen     Tablet .. 975 milliGRAM(s) Oral every 8 hours  albuterol/ipratropium for Nebulization 3 milliLiter(s) Nebulizer every 6 hours  amLODIPine   Tablet 2.5 milliGRAM(s) Oral daily  aspirin enteric coated 81 milliGRAM(s) Oral daily  enoxaparin Injectable 30 milliGRAM(s) SubCutaneous every 24 hours  furosemide    Tablet 20 milliGRAM(s) Oral daily  hydrocortisone 1% Cream 1 Application(s) Topical daily  influenza  Vaccine (HIGH DOSE) 0.7 milliLiter(s) IntraMuscular once  pantoprazole    Tablet 40 milliGRAM(s) Oral before breakfast  polyethylene glycol 3350 17 Gram(s) Oral daily  senna 2 Tablet(s) Oral at bedtime    ALLERGIES: No Known Allergies        SOCIAL HISTORY:  ____________________________________  Social History:  · Substance use	No    FAMILY HISTORY:        VITAL SIGNS LAST 24 HOURS:  T(F): 97.9 (11-07 @ 05:02), Max: 97.9 (11-07 @ 00:06)  HR: 90 (11-07 @ 05:02) (87 - 93)  BP: 139/80 (11-07 @ 05:02) (91/60 - 139/80)  RR: 18 (11-07 @ 05:02) (18 - 18)    PHYSICAL EXAM:     The patient was alert and conversant and in no apparent distress.  OP showed no ulcerations  There was no visible lymphadenopathy.  Conjunctiva were non injected  There was no clubbing or edema of extremities.  The scalp, hair, face, eyebrows, lips, OP, neck, chest, back,   extremities X 4, nails were examined.  There was no hyperhidrosis or bromhidrosis.    Of note on skin exam:   - numerous pink papules and small plaques on the trunk, back and extremities, some with mild scale and few erosions on the back   ____________________________________    LABS:

## 2023-11-07 NOTE — PROGRESS NOTE ADULT - ATTENDING COMMENTS
Doing well.  PT. f/u labs. f/u medicine. DVT ppx
Pt at extreme of old age w FN fx.  moderate to high risk due to AS.  Poor prognosis.  will discuss possible surgery today w family
Pt doing well postoperatively. VSS. OOB. WBAT. f/u labs
f/u medicine.  OOB. WBAT. PT. DVT ppx
Pt doing better. WBAT. DC planning
pt doing better.  + minor LLD after stem subsidence after fall.  WBAT. After discussion w family, will defer revision surgery due to high risk.  OOB. DC planning
s/p fall in hospital yesterday.  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  No acute fractures noticed.  Discussion had w pt family regarding Tx.  The understand that the pt is very high risk for surgery.  They will accept the LLD over risking another surgical intervention.  Pt is WBAT.  Shoe lift as outpt.

## 2023-11-07 NOTE — DISCHARGE NOTE NURSING/CASE MANAGEMENT/SOCIAL WORK - PATIENT PORTAL LINK FT
You can access the FollowMyHealth Patient Portal offered by [facility] by registering at the following website: [email or web address]. By joining [facility]’s FollowMyHealth portal, you will also be able to view your health information using other applications (apps) compatible with our system.

## 2023-11-07 NOTE — PROGRESS NOTE ADULT - PROBLEM SELECTOR PLAN 7
Patient with a covid exposure  Covid testing as per Protocol
Patient with a covid exposure  Covid testing as per Protocol

## 2023-11-09 PROBLEM — I10 ESSENTIAL (PRIMARY) HYPERTENSION: Chronic | Status: ACTIVE | Noted: 2023-10-29

## 2023-11-09 PROBLEM — J44.9 CHRONIC OBSTRUCTIVE PULMONARY DISEASE, UNSPECIFIED: Chronic | Status: ACTIVE | Noted: 2023-10-29

## 2023-11-09 PROBLEM — I48.91 UNSPECIFIED ATRIAL FIBRILLATION: Chronic | Status: ACTIVE | Noted: 2023-10-29

## 2023-11-09 PROBLEM — I25.10 ATHEROSCLEROTIC HEART DISEASE OF NATIVE CORONARY ARTERY WITHOUT ANGINA PECTORIS: Chronic | Status: ACTIVE | Noted: 2023-10-29

## 2023-11-13 ENCOUNTER — NON-APPOINTMENT (OUTPATIENT)
Age: 88
End: 2023-11-13

## 2023-11-20 ENCOUNTER — APPOINTMENT (OUTPATIENT)
Dept: SPINE | Facility: CLINIC | Age: 88
End: 2023-11-20
Payer: MEDICARE

## 2023-11-20 VITALS
BODY MASS INDEX: 20.04 KG/M2 | HEART RATE: 89 BPM | WEIGHT: 140 LBS | SYSTOLIC BLOOD PRESSURE: 139 MMHG | DIASTOLIC BLOOD PRESSURE: 84 MMHG | HEIGHT: 70 IN | OXYGEN SATURATION: 94 %

## 2023-11-20 DIAGNOSIS — Z86.79 PERSONAL HISTORY OF OTHER DISEASES OF THE CIRCULATORY SYSTEM: ICD-10-CM

## 2023-11-20 DIAGNOSIS — Z87.19 PERSONAL HISTORY OF OTHER DISEASES OF THE DIGESTIVE SYSTEM: ICD-10-CM

## 2023-11-20 DIAGNOSIS — M54.2 CERVICALGIA: ICD-10-CM

## 2023-11-20 PROCEDURE — 99203 OFFICE O/P NEW LOW 30 MIN: CPT

## 2023-11-20 RX ORDER — PANTOPRAZOLE 40 MG/1
40 TABLET, DELAYED RELEASE ORAL
Refills: 0 | Status: ACTIVE | COMMUNITY

## 2023-11-20 RX ORDER — LORATADINE 5 MG
TABLET,CHEWABLE ORAL
Refills: 0 | Status: ACTIVE | COMMUNITY

## 2023-11-20 RX ORDER — SENNOSIDES 8.6 MG TABLETS 8.6 MG/1
8.6 TABLET ORAL
Refills: 0 | Status: ACTIVE | COMMUNITY

## 2023-11-20 RX ORDER — IPRATROPIUM BROMIDE AND ALBUTEROL SULFATE 2.5; .5 MG/3ML; MG/3ML
0.5-2.5 (3) SOLUTION RESPIRATORY (INHALATION)
Refills: 0 | Status: ACTIVE | COMMUNITY

## 2023-11-20 RX ORDER — HYDROCORTISONE 10 MG/G
1 CREAM TOPICAL
Refills: 0 | Status: ACTIVE | COMMUNITY

## 2023-11-20 RX ORDER — ENOXAPARIN SODIUM 300 MG/3ML
300 INJECTION INTRAVENOUS; SUBCUTANEOUS
Refills: 0 | Status: ACTIVE | COMMUNITY

## 2023-11-20 RX ORDER — ACETAMINOPHEN 500 MG/1
500 TABLET, COATED ORAL
Refills: 0 | Status: ACTIVE | COMMUNITY

## 2023-11-28 ENCOUNTER — APPOINTMENT (OUTPATIENT)
Dept: ORTHOPEDIC SURGERY | Facility: CLINIC | Age: 88
End: 2023-11-28
Payer: MEDICARE

## 2023-11-28 VITALS — WEIGHT: 140 LBS | HEIGHT: 70 IN | BODY MASS INDEX: 20.04 KG/M2

## 2023-11-28 DIAGNOSIS — Z96.649 PRESENCE OF UNSPECIFIED ARTIFICIAL HIP JOINT: ICD-10-CM

## 2023-11-28 DIAGNOSIS — S72.001D FRACTURE OF UNSPECIFIED PART OF NECK OF RIGHT FEMUR, SUBSEQUENT ENCOUNTER FOR CLOSED FRACTURE WITH ROUTINE HEALING: ICD-10-CM

## 2023-11-28 PROCEDURE — 99024 POSTOP FOLLOW-UP VISIT: CPT

## 2023-11-28 PROCEDURE — 73502 X-RAY EXAM HIP UNI 2-3 VIEWS: CPT | Mod: 26

## 2023-11-30 PROBLEM — Z96.649 S/P HIP HEMIARTHROPLASTY: Status: ACTIVE | Noted: 2023-11-27

## 2023-11-30 PROBLEM — S72.001D CLOSED DISPLACED FRACTURE OF RIGHT FEMORAL NECK WITH ROUTINE HEALING: Status: ACTIVE | Noted: 2023-11-27

## 2023-12-18 ENCOUNTER — TRANSCRIPTION ENCOUNTER (OUTPATIENT)
Age: 88
End: 2023-12-18

## 2024-02-15 NOTE — PRE-OP CHECKLIST - SPO2 (%)
Pt here for PAT visit.  Pre-op tests completed, instructed shower w/antibacterial soap, and preop instructions reviewed.  Instructed clears until 7:00 am dos, voiced understanding.  
98
96

## 2024-03-04 ENCOUNTER — APPOINTMENT (OUTPATIENT)
Dept: ORTHOPEDIC SURGERY | Facility: CLINIC | Age: 89
End: 2024-03-04

## 2024-04-04 NOTE — PATIENT PROFILE ADULT - LEGAL HELP
Please return to the emergency department for any worsening symptoms. Please follow-up with your PCP. If you have any concerns or questions please return the emergency department. no

## (undated) DEVICE — PRESSURIZER FEM CNL W/O HUB MED

## (undated) DEVICE — SPECIMEN CONTAINER 100ML

## (undated) DEVICE — DRSG TAPE MICROFOAM 3"

## (undated) DEVICE — GLV 7 PROTEXIS (WHITE)

## (undated) DEVICE — DRAPE 3/4 SHEET W REINFORCEMENT 56X77"

## (undated) DEVICE — POSITIONER FOAM ABDUCTION PILLOW MED (PINK)

## (undated) DEVICE — HOOD FLYTE STRYKER HELMET SHIELD

## (undated) DEVICE — WOUND IRR SURGIPHOR

## (undated) DEVICE — ELCTR BOVIE PENCIL SMOKE EVACUATION

## (undated) DEVICE — SAW BLADE STRYKER RECIPROCATING 77.6X0.77X11.2MM

## (undated) DEVICE — SOL IRR POUR NS 0.9% 500ML

## (undated) DEVICE — SOL IRR POUR H2O 1000ML

## (undated) DEVICE — GLV 6.5 PROTEXIS (WHITE)

## (undated) DEVICE — DRAPE HIP W POUCHES 87X115X134"

## (undated) DEVICE — Device

## (undated) DEVICE — STRYKER FEMORAL CANAL BRUSH

## (undated) DEVICE — SOL IRR BAG NS 0.9% 3000ML

## (undated) DEVICE — SUT POLYSORB 0 30" GS-21 UNDYED

## (undated) DEVICE — VENODYNE/SCD SLEEVE CALF LARGE

## (undated) DEVICE — GLV 8.5 PROTEXIS (WHITE)

## (undated) DEVICE — SUCTION YANKAUER NO CONTROL VENT

## (undated) DEVICE — WARMING BLANKET UPPER ADULT

## (undated) DEVICE — POSITIONER FOAM EGG CRATE ULNAR 2PCS (PINK)

## (undated) DEVICE — SUT POLYSORB 1 36" GS-21 UNDYED

## (undated) DEVICE — GLV 7.5 PROTEXIS (WHITE)

## (undated) DEVICE — STAPLER SKIN VISI-STAT 35 WIDE

## (undated) DEVICE — GLV 8 PROTEXIS (WHITE)

## (undated) DEVICE — PACK TOTAL HIP (2 PACKS)

## (undated) DEVICE — SUT POLYSORB 2-0 36" GS-21 UNDYED